# Patient Record
Sex: FEMALE | Race: WHITE | NOT HISPANIC OR LATINO | Employment: FULL TIME | URBAN - METROPOLITAN AREA
[De-identification: names, ages, dates, MRNs, and addresses within clinical notes are randomized per-mention and may not be internally consistent; named-entity substitution may affect disease eponyms.]

---

## 2017-08-01 ASSESSMENT — ENCOUNTER SYMPTOMS
PANIC: 0
DECREASED CONCENTRATION: 1
RECTAL PAIN: 0
HEARTBURN: 0
JAUNDICE: 0
INSOMNIA: 1
RECTAL BLEEDING: 0
ABDOMINAL PAIN: 0
CONSTIPATION: 0
DIARRHEA: 1
VOMITING: 0
BLOOD IN STOOL: 0
BLOATING: 0
BOWEL INCONTINENCE: 0
DEPRESSION: 0
NAUSEA: 1
NERVOUS/ANXIOUS: 1

## 2017-08-01 ASSESSMENT — ANXIETY QUESTIONNAIRES
4. TROUBLE RELAXING: MORE THAN HALF THE DAYS
GAD7 TOTAL SCORE: 7
5. BEING SO RESTLESS THAT IT IS HARD TO SIT STILL: SEVERAL DAYS
7. FEELING AFRAID AS IF SOMETHING AWFUL MIGHT HAPPEN: MORE THAN HALF THE DAYS
GAD7 TOTAL SCORE: 7
7. FEELING AFRAID AS IF SOMETHING AWFUL MIGHT HAPPEN: MORE THAN HALF THE DAYS
6. BECOMING EASILY ANNOYED OR IRRITABLE: NOT AT ALL
2. NOT BEING ABLE TO STOP OR CONTROL WORRYING: NOT AT ALL
3. WORRYING TOO MUCH ABOUT DIFFERENT THINGS: NOT AT ALL
GAD7 TOTAL SCORE: 7
1. FEELING NERVOUS, ANXIOUS, OR ON EDGE: MORE THAN HALF THE DAYS

## 2017-08-02 ASSESSMENT — ANXIETY QUESTIONNAIRES: GAD7 TOTAL SCORE: 7

## 2017-08-08 ENCOUNTER — OFFICE VISIT (OUTPATIENT)
Dept: OBGYN | Facility: CLINIC | Age: 42
End: 2017-08-08
Attending: NURSE PRACTITIONER
Payer: COMMERCIAL

## 2017-08-08 VITALS
DIASTOLIC BLOOD PRESSURE: 76 MMHG | WEIGHT: 115 LBS | HEART RATE: 93 BPM | SYSTOLIC BLOOD PRESSURE: 115 MMHG | HEIGHT: 63 IN | BODY MASS INDEX: 20.38 KG/M2

## 2017-08-08 DIAGNOSIS — Z30.432 ENCOUNTER FOR IUD REMOVAL: Primary | ICD-10-CM

## 2017-08-08 DIAGNOSIS — Z11.3 SCREEN FOR STD (SEXUALLY TRANSMITTED DISEASE): ICD-10-CM

## 2017-08-08 PROCEDURE — 99212 OFFICE O/P EST SF 10 MIN: CPT | Mod: ZF

## 2017-08-08 PROCEDURE — 58301 REMOVE INTRAUTERINE DEVICE: CPT | Mod: ZF | Performed by: NURSE PRACTITIONER

## 2017-08-08 PROCEDURE — 86803 HEPATITIS C AB TEST: CPT | Performed by: NURSE PRACTITIONER

## 2017-08-08 PROCEDURE — 87491 CHLMYD TRACH DNA AMP PROBE: CPT | Performed by: NURSE PRACTITIONER

## 2017-08-08 PROCEDURE — 86780 TREPONEMA PALLIDUM: CPT | Performed by: NURSE PRACTITIONER

## 2017-08-08 PROCEDURE — 36415 COLL VENOUS BLD VENIPUNCTURE: CPT | Performed by: NURSE PRACTITIONER

## 2017-08-08 PROCEDURE — 87591 N.GONORRHOEAE DNA AMP PROB: CPT | Performed by: NURSE PRACTITIONER

## 2017-08-08 PROCEDURE — 87389 HIV-1 AG W/HIV-1&-2 AB AG IA: CPT | Performed by: NURSE PRACTITIONER

## 2017-08-08 ASSESSMENT — PAIN SCALES - GENERAL: PAINLEVEL: NO PAIN (0)

## 2017-08-08 NOTE — LETTER
"2017       RE: Anastasia Colbert  [address]  [place] [address]     Dear Colleague,    Thank you for referring your patient, Anastasia Colbert, to the [facility] at [facility]. Please see a copy of my visit note below.    SUBJECTIVE: Anastasia Colbert is a 42 year old  female, requests removal of IUD and consult re: STD testing and Pap smear frequency.    1)  Consult for frequency of Pap smears: Anastasia had a LEEP in .  Has not had any abnormal pap smears since then.  Desiring to know how often she should have cervical cancer screening.  Last pap two pap smears:  NIL and 10/2016 NIL, HPV negative     2) Desire for Paragard IUD removal as it expires 2017 and discussion of birth control methods.  Recently went through a divorce and has a new male partner who has had a vasectomy and completed the recommended follow-up.  The Paragard IUD has made her have heavier menstrual bleeding.  Anastasia has not liked the way that hormonal birth control has made her feel in the past    3) Desire for STD testing: New sexual partner x 1 month; he has completed STD testing which was negative.  He does have other sexual partners.  No known exposures to STDs; denies any vaginal or pelvic symptoms today.     Verification of Procedure:  Just before the procedure began through verbal and active participation of team members, I verified:     Initials   Patient Name SLP   Patient  SLP   Procedure to be performed SLP     Consent:  Risks, benefits of treatment, and alternative options for contraception were discussed.  Patient's questions were elicited and answered.  Written consent was obtained and scanned into medical record.     OBJECTIVE: /76 (BP Location: Left arm, Patient Position: Chair, Cuff Size: Adult Regular)  Pulse 93  Ht 1.6 m (5' 2.99\")  Wt 52.2 kg (115 lb)  LMP 2017  BMI 20.38 kg/m2    Pelvic " Exam:  EG/BUS: Normal genital architecture without lesions, erythema or abnormal secretions. Bartholin's, Urethra, [place]'s glands are normal.   Vagina: moist, pink, rugae with creamy, white and odorless secretions  Cervix: pink, moist, closed, without lesion or CMT and IUD strings extend 1 cm from external os.  Uterus: small, smooth, firm, mobile w/o pain     PROCEDURE NOTE - ParaGard IUD removal    Reason for removal: time has  on previous IUD    Cervix was visualized with a medium Jimy speculum. The strings were grasped with a uterine packing forceps and the IUD removed with gentle traction.  No resistance was encountered.  The ParaGard IUD immediately regained resting shape and was without odor or discoloration.  There were no complications.  Anastasia Colbert reported no pain.  There was minimal bleeding.     EBL:  Minimal     Complications: None      Anastasia Colbert tolerated procedure well.    PLAN:    1. Paragard IUD was removed today.  Patient was instructed to report heavy bleeding, severe cramping, or abnormal vaginal discharge.  May take Ibuprofen 400-800 mg PO TID PRN or Naproxen 500 mg PO BID for cramping.  Anastasia was instructed that her fertility is immediately reversed now that the IUD has been removed. Discussed contraceptive options with Anastasia. She only has 1 sexual partner now, who has had a vasectomy.  No plans for new partners at this time.  She does not desire any hormonal birth control; would like to avoid another Paragard if possible due to her heavier bleeding.  After discussion, Anastasia desires to not have another Paragard IUD placed at this time, and expressed understanding that if she were to change partners, that she would be at risk of pregnancy if not using birth control.  Anastasia will return to clinic for placement of a Paragard IUD if she changes partners and needs birth control.      2) Discussed recommendations for pap smear testing.  Given that her last abnormal pap  smear was 16 years ago and all screening since then has been normal, Anastasia may resume regular, routine screening intervals.  Last pap smear was 10/2016 and cytology was NIL and HPV test was negative; next pap smear is due 10/2021.    3) Discussed length of time from exposure until each STD test would result as positive.  Anastasia desires STD testing today.  Gonorrhea & chlamydia testing completed and HIV, Hep C, and Anti-treponema to be completed at the lab following this visit.  Anastasia may check with her insurance how often STD screening would be covered; encouraged completing STD testing as part of her annual exams if her and her partners are not in a monogamous relationship.     Anastasia Amy Clobert  verbalized understanding of instructions.    Isi Thakkar, DNP, APRN, WHNP

## 2017-08-08 NOTE — PROGRESS NOTES
"  SUBJECTIVE: Anastasia Colbert is a 42 year old  female, requests removal of IUD and consult re: STD testing and Pap smear frequency.    1)  Consult for frequency of Pap smears: Anastasia had a LEEP in .  Has not had any abnormal pap smears since then.  Desiring to know how often she should have cervical cancer screening.  Last pap two pap smears:  NIL and 10/2016 NIL, HPV negative     2) Desire for Paragard IUD removal as it expires 2017 and discussion of birth control methods.  Recently went through a divorce and has a new male partner who has had a vasectomy and completed the recommended follow-up.  The Paragard IUD has made her have heavier menstrual bleeding.  Anastasia has not liked the way that hormonal birth control has made her feel in the past    3) Desire for STD testing: New sexual partner x 1 month; he has completed STD testing which was negative.  He does have other sexual partners.  No known exposures to STDs; denies any vaginal or pelvic symptoms today.     Verification of Procedure:  Just before the procedure began through verbal and active participation of team members, I verified:     Initials   Patient Name SLP   Patient  SLP   Procedure to be performed SLP     Consent:  Risks, benefits of treatment, and alternative options for contraception were discussed.  Patient's questions were elicited and answered.  Written consent was obtained and scanned into medical record.     OBJECTIVE: /76 (BP Location: Left arm, Patient Position: Chair, Cuff Size: Adult Regular)  Pulse 93  Ht 1.6 m (5' 2.99\")  Wt 52.2 kg (115 lb)  LMP 2017  BMI 20.38 kg/m2    Pelvic Exam:  EG/BUS: Normal genital architecture without lesions, erythema or abnormal secretions. Bartholin's, Urethra, [place]'s glands are normal.   Vagina: moist, pink, rugae with creamy, white and odorless secretions  Cervix: pink, moist, closed, without lesion or CMT and IUD strings extend 1 cm from external " os.  Uterus: small, smooth, firm, mobile w/o pain     PROCEDURE NOTE - ParaGard IUD removal    Reason for removal: time has  on previous IUD    Cervix was visualized with a medium Jimy speculum. The strings were grasped with a uterine packing forceps and the IUD removed with gentle traction.  No resistance was encountered.  The ParaGard IUD immediately regained resting shape and was without odor or discoloration.  There were no complications.  Anastasia Colbert reported no pain.  There was minimal bleeding.     EBL:  Minimal     Complications: None      Anastasia Colbert tolerated procedure well.    PLAN:    1. Paragard IUD was removed today.  Patient was instructed to report heavy bleeding, severe cramping, or abnormal vaginal discharge.  May take Ibuprofen 400-800 mg PO TID PRN or Naproxen 500 mg PO BID for cramping.  Anastasia was instructed that her fertility is immediately reversed now that the IUD has been removed. Discussed contraceptive options with Anastasia. She only has 1 sexual partner now, who has had a vasectomy.  No plans for new partners at this time.  She does not desire any hormonal birth control; would like to avoid another Paragard if possible due to her heavier bleeding.  After discussion, Anastasia desires to not have another Paragard IUD placed at this time, and expressed understanding that if she were to change partners, that she would be at risk of pregnancy if not using birth control.  Anastasia will return to clinic for placement of a Paragard IUD if she changes partners and needs birth control.      2) Discussed recommendations for pap smear testing.  Given that her last abnormal pap smear was 16 years ago and all screening since then has been normal, Anastasia may resume regular, routine screening intervals.  Last pap smear was 10/2016 and cytology was NIL and HPV test was negative; next pap smear is due 10/2021.    3) Discussed length of time from exposure until each STD test would  result as positive.  Anastasia desires STD testing today.  Gonorrhea & chlamydia testing completed and HIV, Hep C, and Anti-treponema to be completed at the lab following this visit.  Anastasia may check with her insurance how often STD screening would be covered; encouraged completing STD testing as part of her annual exams if her and her partners are not in a monogamous relationship.     Anastasia Amy Colbert  verbalized understanding of instructions.    Isi Thakkar, DNP, APRN, WHNP

## 2017-08-09 LAB
C TRACH DNA SPEC QL NAA+PROBE: NORMAL
HCV AB SERPL QL IA: NORMAL
HIV 1+2 AB+HIV1 P24 AG SERPL QL IA: NORMAL
N GONORRHOEA DNA SPEC QL NAA+PROBE: NORMAL
SPECIMEN SOURCE: NORMAL
SPECIMEN SOURCE: NORMAL
T PALLIDUM IGG+IGM SER QL: NEGATIVE

## 2017-10-11 ENCOUNTER — OFFICE VISIT (OUTPATIENT)
Dept: OBGYN | Facility: CLINIC | Age: 42
End: 2017-10-11
Attending: OBSTETRICS & GYNECOLOGY
Payer: COMMERCIAL

## 2017-10-11 VITALS
SYSTOLIC BLOOD PRESSURE: 109 MMHG | HEIGHT: 63 IN | BODY MASS INDEX: 20.25 KG/M2 | WEIGHT: 114.3 LBS | HEART RATE: 68 BPM | DIASTOLIC BLOOD PRESSURE: 63 MMHG

## 2017-10-11 DIAGNOSIS — N89.8 VAGINAL DISCHARGE: Primary | ICD-10-CM

## 2017-10-11 DIAGNOSIS — N76.0 BV (BACTERIAL VAGINOSIS): ICD-10-CM

## 2017-10-11 DIAGNOSIS — B96.89 BV (BACTERIAL VAGINOSIS): ICD-10-CM

## 2017-10-11 PROCEDURE — 87591 N.GONORRHOEAE DNA AMP PROB: CPT | Performed by: OBSTETRICS & GYNECOLOGY

## 2017-10-11 PROCEDURE — 87491 CHLMYD TRACH DNA AMP PROBE: CPT | Performed by: OBSTETRICS & GYNECOLOGY

## 2017-10-11 RX ORDER — METRONIDAZOLE 500 MG/1
500 TABLET ORAL 2 TIMES DAILY
Qty: 14 TABLET | Refills: 1 | Status: SHIPPED | OUTPATIENT
Start: 2017-10-11 | End: 2018-03-16

## 2017-10-11 NOTE — LETTER
"10/11/2017       RE: Anastasia Colbert  [address]  [place] [address]     Dear Colleague,    Thank you for referring your patient, Anastasia Colbert, to the [facility] at [facility]. Please see a copy of my visit note below.    S: 41 yo P2 with new onset 3 days irritating watery foul discharge with pelvic pressure and dysuria.   No fevers, chills, nausea, emesis, diarrhea.     Patient Active Problem List   Diagnosis     Hypothyroidism     Abnormal glandular Papanicolaou smear of cervix     Past Medical History:   Diagnosis Date     Abnormal glandular Papanicolaou smear of cervix 2001    LEEP     Allergic rhinitis      Depressive disorder, not elsewhere classified     hx of as teen (hosp 1993)     Eating disorder, unspecified     Hx of      Intestinal bacterial overgrowth      Unspecified hypothyroidism     Borderline- TSH 5.20 6/19/06. On levoxyl 50 mcg 44/05     /63  Pulse 68  Ht 1.6 m (5' 2.99\")  Wt 51.8 kg (114 lb 4.8 oz)  LMP 10/01/2017 (Exact Date)  BMI 20.25 kg/m2  Appears well. Abd: soft NT, ND  EG wnl  Vagina: thin grey discharge  Wet prep clue cells    A: BV  P: GC/CH sent  Rx sent.       Again, thank you for allowing me to participate in the care of your patient.      Sincerely,    Elsa Dupont MD      "

## 2017-10-11 NOTE — MR AVS SNAPSHOT
"              After Visit Summary   10/11/2017    Anastasia Colbert    MRN: [id number]           Patient Information     Date Of Birth          1975        Visit Information        Provider Department      10/11/2017 7:45 AM Elsa Dupont MD [facility]        Today's Diagnoses     Vaginal discharge    -  1    BV (bacterial vaginosis)           Follow-ups after your visit        Who to contact     Please call your clinic at [phone] to:    Ask questions about your health    Make or cancel appointments    Discuss your medicines    Learn about your test results    Speak to your doctor   If you have compliments or concerns about an experience at your clinic, or if you wish to file a complaint, please contact [facility] Patient Relations at [phone] or email us at [email or web address]         Additional Information About Your Visit        MyChart Information     [employer] gives you secure access to your electronic health record. If you see a primary care provider, you can also send messages to your care team and make appointments. If you have questions, please call your primary care clinic.  If you do not have a primary care provider, please call [phone] and they will assist you.      [employer] is an electronic gateway that provides easy, online access to your medical records. With [employer], you can request a clinic appointment, read your test results, renew a prescription or communicate with your care team.     To access your existing account, please contact your [facility] or call [phone] for assistance.        Care EveryWhere ID     This is your Care EveryWhere ID. This could be used by other organizations to access your [place] medical records  [id number]        Your Vitals Were     Pulse Height Last Period BMI (Body Mass Index)          68 1.6 m (5' 2.99\") 10/01/2017 (Exact Date) 20.25 kg/m2   "       Blood Pressure from Last 3 Encounters:   10/11/17 109/63   08/08/17 115/76   12/19/16 113/71    Weight from Last 3 Encounters:   10/11/17 51.8 kg (114 lb 4.8 oz)   08/08/17 52.2 kg (115 lb)   12/19/16 52.8 kg (116 lb 8 oz)              We Performed the Following     Chlamydia PCR (Clinic Collect)     Gonorrhea PCR     Routine UA with Micro Reflex to Culture     Wet Prep (Collected in Clinic)          Today's Medication Changes          These changes are accurate as of: 10/11/17  9:45 AM.  If you have any questions, ask your nurse or doctor.               Start taking these medicines.        Dose/Directions    metroNIDAZOLE 500 MG tablet   Commonly known as:  FLAGYL   Used for:  Vaginal discharge, BV (bacterial vaginosis)   Started by:  Elsa Dupont MD        Dose:  500 mg   Take 1 tablet (500 mg) by mouth 2 times daily   Quantity:  14 tablet   Refills:  1            Where to get your medicines      These medications were sent to [facility] [address]  [address] [facility] [address] [address]     Phone:  [phone]     metroNIDAZOLE 500 MG tablet                Primary Care Provider Office Phone # Fax #    Alfa Lundberg [phone]       [address] [facility] [address]  [facility] [address]        Equal Access to Services     ROBERTA RODGERS AH: Hadii tk ku hadasho Soomaali, waaxda luqadaha, qaybta kaalmada adeegyada, manny youssef. So [facility] [phone].    ATENCIÓN: Si habla español, tiene a tamayo disposición servicios gratuitos de asistencia lingüística. Llame al [phone].    We comply with applicable federal civil rights laws and [place] laws. We do not discriminate on the basis of race, color, national origin, age, disability, sex, sexual orientation, or gender identity.            Thank you!     Thank you for choosing [facility]  for your care. Our goal is always to provide you with  excellent care. Hearing back from our patients is one way we can continue to improve our services. Please take a few minutes to complete the written survey that you may receive in the mail after your visit with us. Thank you!             Your Updated Medication List - Protect others around you: Learn how to safely use, store and throw away your medicines at www.disposemymeds.org.          This list is accurate as of: 10/11/17  9:45 AM.  Always use your most recent med list.                   Brand Name Dispense Instructions for use Diagnosis    CLARITIN PO      Take 1 tablet by mouth.    Shortness of breath, Diarrhea, Nausea with vomiting, Chest pain, unspecified, Abdominal pain, epigastric, Palpitations, Hypotension, Sweaty palms, Tachycardia, Sweats, sweating, excessive       levothyroxine 75 MCG tablet    SYNTHROID/LEVOTHROID    90 tablet    Take 1 tablet (75 mcg) by mouth daily    Hypothyroidism       metroNIDAZOLE 500 MG tablet    FLAGYL    14 tablet    Take 1 tablet (500 mg) by mouth 2 times daily    Vaginal discharge, BV (bacterial vaginosis)       paragard intrauterine copper     one    insert one in the clinic today    Other general counseling and advice for contraceptive management, Encounter for insertion or removal of intrauterine contraceptive device       rifaximin 550 MG Tabs tablet    XIFAXAN    42 tablet    Take 1 tablet (550 mg) by mouth 3 times daily    Bacterial overgrowth syndrome       sertraline 25 MG tablet    ZOLOFT    30 tablet    Take 1 tablet (25 mg) by mouth daily    Anxiety

## 2017-10-11 NOTE — PROGRESS NOTES
"S: 43 yo P2 with new onset 3 days irritating watery foul discharge with pelvic pressure and dysuria.   No fevers, chills, nausea, emesis, diarrhea.     Patient Active Problem List   Diagnosis     Hypothyroidism     Abnormal glandular Papanicolaou smear of cervix     Past Medical History:   Diagnosis Date     Abnormal glandular Papanicolaou smear of cervix 2001    LEEP     Allergic rhinitis      Depressive disorder, not elsewhere classified     hx of as teen (hosp 1993)     Eating disorder, unspecified     Hx of      Intestinal bacterial overgrowth      Unspecified hypothyroidism     Borderline- TSH 5.20 6/19/06. On levoxyl 50 mcg 44/05     /63  Pulse 68  Ht 1.6 m (5' 2.99\")  Wt 51.8 kg (114 lb 4.8 oz)  LMP 10/01/2017 (Exact Date)  BMI 20.25 kg/m2  Appears well. Abd: soft NT, ND  EG wnl  Vagina: thin grey discharge  Wet prep clue cells    A: BV  P: GC/CH sent  Rx sent.   Elsa Dupont    "

## 2017-10-12 LAB
C TRACH DNA SPEC QL NAA+PROBE: NEGATIVE
N GONORRHOEA DNA SPEC QL NAA+PROBE: NEGATIVE
SPECIMEN SOURCE: NORMAL
SPECIMEN SOURCE: NORMAL

## 2017-11-06 ENCOUNTER — TELEPHONE (OUTPATIENT)
Dept: OBGYN | Facility: CLINIC | Age: 42
End: 2017-11-06

## 2017-11-06 DIAGNOSIS — N76.0 BACTERIAL VAGINOSIS: Primary | ICD-10-CM

## 2017-11-06 DIAGNOSIS — B96.89 BACTERIAL VAGINOSIS: Primary | ICD-10-CM

## 2017-11-06 RX ORDER — METRONIDAZOLE 7.5 MG/G
1 GEL VAGINAL AT BEDTIME
Qty: 70 G | Refills: 0 | Status: SHIPPED | OUTPATIENT
Start: 2017-11-06 | End: 2017-12-14

## 2017-11-06 NOTE — TELEPHONE ENCOUNTER
Anastasia saw Dr Dupont 10/11/17 and was prescribed flagyl for BV. She reports it helped but has same s/s once again. This time requesting metrogel because flagyl was too hard on her stomach.  I will forward to on call provider, Dr Zamorano .    ,

## 2017-12-14 ENCOUNTER — TELEPHONE (OUTPATIENT)
Dept: OBGYN | Facility: CLINIC | Age: 42
End: 2017-12-14

## 2017-12-14 DIAGNOSIS — B96.89 BACTERIAL VAGINOSIS: ICD-10-CM

## 2017-12-14 DIAGNOSIS — N76.0 BACTERIAL VAGINOSIS: ICD-10-CM

## 2017-12-14 RX ORDER — METRONIDAZOLE 7.5 MG/G
1 GEL VAGINAL AT BEDTIME
Qty: 70 G | Refills: 0 | Status: SHIPPED | OUTPATIENT
Start: 2017-12-14 | End: 2018-03-16

## 2017-12-14 NOTE — TELEPHONE ENCOUNTER
----- Message from Chance Culp sent at 12/14/2017  3:00 PM CST -----  Regarding: Call pt- discuss reoccuring BV  Contact: [phone]  Pt would like a call back today if possible to discuss her reoccurring BV. She has an appt scheduled for 12/20/17 but wants to know if she should be taking a med or not. Please f/u with her at [phone]. Thank you.    GY    Please DO NOT send this message and/or reply back to sender.  Call Center Representatives DO NOT respond to messages.

## 2017-12-14 NOTE — TELEPHONE ENCOUNTER
Spoke with Dr. Dupont in clinic who agreed to refill Metrogel before her upcoming appointment. Spoke with patient and she is wondering what might be done at her upcoming appointment, nurse discussed possibility of trying boric acid. Patient agreeable to plan. Will start metrogel and be seen next week.

## 2017-12-17 ENCOUNTER — HEALTH MAINTENANCE LETTER (OUTPATIENT)
Age: 42
End: 2017-12-17

## 2018-02-06 DIAGNOSIS — E03.9 HYPOTHYROIDISM: ICD-10-CM

## 2018-02-06 NOTE — TELEPHONE ENCOUNTER
levothyroxine (SYNTHROID/LEVOTHROID) 75 MCG tablet      Last Written Prescription Date:  12/22/16  Last Fill Quantity: 90,   # refills: 3  Last Office Visit : 11/15/16  Future Office visit:  2/16/18    Routing refill request to provider for review/approval because:  Med failed protocol-lab and appt overdue.  Last TSH 12/19/16- No future order in chart.  Pended Rx for 30 days-no added refills until seen.

## 2018-02-08 RX ORDER — LEVOTHYROXINE SODIUM 75 UG/1
75 TABLET ORAL DAILY
Qty: 90 TABLET | Refills: 0 | Status: SHIPPED | OUTPATIENT
Start: 2018-02-08 | End: 2018-02-16

## 2018-02-13 NOTE — TELEPHONE ENCOUNTER
Patient called asking about status of Levothyroxine refill. Order was signed on 2-8-18. Called [facility], They did receive the order and the medication was shipped on 2-9-18. If patient does not receive it today it will be in the next couple of days. The patients was called and this information was left for her with phone number to call back with any questions.    Kathleen M Doege RN

## 2018-02-14 ASSESSMENT — ENCOUNTER SYMPTOMS
CONSTIPATION: 0
DIARRHEA: 1
FATIGUE: 0
HOARSE VOICE: 1
JAUNDICE: 0
NERVOUS/ANXIOUS: 1
SORE THROAT: 0
TROUBLE SWALLOWING: 0
DECREASED APPETITE: 1
INSOMNIA: 1
TASTE DISTURBANCE: 0
BLOOD IN STOOL: 0
WEIGHT LOSS: 0
HALLUCINATIONS: 0
FEVER: 0
DEPRESSION: 1
WEIGHT GAIN: 0
HEARTBURN: 0
ABDOMINAL PAIN: 0
NIGHT SWEATS: 0
RECTAL PAIN: 0
NECK MASS: 1
DECREASED CONCENTRATION: 1
SINUS CONGESTION: 1
INCREASED ENERGY: 0
SMELL DISTURBANCE: 0
NAUSEA: 1
PANIC: 0
POLYPHAGIA: 0
BOWEL INCONTINENCE: 0
SINUS PAIN: 0
VOMITING: 0
ALTERED TEMPERATURE REGULATION: 1
BLOATING: 0
POLYDIPSIA: 0
CHILLS: 0

## 2018-02-16 ENCOUNTER — OFFICE VISIT (OUTPATIENT)
Dept: FAMILY MEDICINE | Facility: CLINIC | Age: 43
End: 2018-02-16
Payer: COMMERCIAL

## 2018-02-16 VITALS
DIASTOLIC BLOOD PRESSURE: 64 MMHG | RESPIRATION RATE: 20 BRPM | SYSTOLIC BLOOD PRESSURE: 96 MMHG | OXYGEN SATURATION: 98 % | WEIGHT: 109.2 LBS | BODY MASS INDEX: 19.35 KG/M2 | HEART RATE: 76 BPM

## 2018-02-16 DIAGNOSIS — E55.9 VITAMIN D DEFICIENCY: ICD-10-CM

## 2018-02-16 DIAGNOSIS — F41.9 ANXIETY: ICD-10-CM

## 2018-02-16 DIAGNOSIS — Z11.3 SCREEN FOR STD (SEXUALLY TRANSMITTED DISEASE): ICD-10-CM

## 2018-02-16 DIAGNOSIS — R49.9 VOICE COMPLAINT: Primary | ICD-10-CM

## 2018-02-16 DIAGNOSIS — Z00.00 ROUTINE GENERAL MEDICAL EXAMINATION AT A HEALTH CARE FACILITY: ICD-10-CM

## 2018-02-16 DIAGNOSIS — H61.23 BILATERAL IMPACTED CERUMEN: ICD-10-CM

## 2018-02-16 DIAGNOSIS — E03.9 HYPOTHYROIDISM, UNSPECIFIED TYPE: ICD-10-CM

## 2018-02-16 LAB
DEPRECATED CALCIDIOL+CALCIFEROL SERPL-MC: 16 UG/L (ref 20–75)
ERYTHROCYTE [DISTWIDTH] IN BLOOD BY AUTOMATED COUNT: 11.8 % (ref 10–15)
HCT VFR BLD AUTO: 42.9 % (ref 35–47)
HGB BLD-MCNC: 14.1 G/DL (ref 11.7–15.7)
HIV 1+2 AB+HIV1 P24 AG SERPL QL IA: NONREACTIVE
MCH RBC QN AUTO: 32.2 PG (ref 26.5–33)
MCHC RBC AUTO-ENTMCNC: 32.9 G/DL (ref 31.5–36.5)
MCV RBC AUTO: 98 FL (ref 78–100)
PLATELET # BLD AUTO: 182 10E9/L (ref 150–450)
RBC # BLD AUTO: 4.38 10E12/L (ref 3.8–5.2)
TSH SERPL DL<=0.005 MIU/L-ACNC: 2.6 MU/L (ref 0.4–4)
WBC # BLD AUTO: 4.9 10E9/L (ref 4–11)

## 2018-02-16 RX ORDER — LEVOTHYROXINE SODIUM 75 UG/1
75 TABLET ORAL DAILY
Qty: 90 TABLET | Refills: 3 | Status: SHIPPED | OUTPATIENT
Start: 2018-02-16 | End: 2019-07-07

## 2018-02-16 RX ORDER — SERTRALINE HYDROCHLORIDE 25 MG/1
25 TABLET, FILM COATED ORAL DAILY
Qty: 90 TABLET | Refills: 3 | Status: SHIPPED | OUTPATIENT
Start: 2018-02-16 | End: 2018-08-07

## 2018-02-16 ASSESSMENT — PAIN SCALES - GENERAL: PAINLEVEL: NO PAIN (0)

## 2018-02-16 NOTE — MR AVS SNAPSHOT
After Visit Summary   2/16/2018    Anastasia Colbert    MRN: [id number]           Patient Information     Date Of Birth          1975        Visit Information        Provider Department      2/16/2018 8:05 AM Alfa Lundberg MD [facility]        Today's Diagnoses     Voice complaint    -  1    Routine general medical examination at a health care facility        Vitamin D deficiency        Hypothyroidism, unspecified type        Screen for STD (sexually transmitted disease)        Anxiety        Bilateral impacted cerumen          Care Instructions    Primary Care Center: [phone]     Primary Care Center Medication Refill Request Information:  * Please contact your pharmacy regarding ANY request for medication refills.  ** [facility] Prescription Fax = [phone]  * Please allow 3 business days for routine medication refills.  * Please allow 5 business days for controlled substance medication refills.     Primary Care Center Test Result notification information:  *You will be notified with in 7-10 days of your appointment day regarding the results of your test.  If you are on MyChart you will be notified as soon as the provider has reviewed the results and signed off on them.              Follow-ups after your visit        Additional Services     OTOLARYNGOLOGY REFERRAL       Your provider has referred you to: [facility]: Adult Ear, Nose and Throat Clinic (Otolaryngology) - [place] [phone]  [email or web address]    Dr Aguiar    Please be aware that coverage of these services is subject to the terms and limitations of your health insurance plan.  Call member services at your health plan with any benefit or coverage questions.      Please bring the following with you to your appointment:    (1) Any X-Rays, CTs or MRIs which have been performed.  Contact the facility where they were done to arrange for  prior to your  "scheduled appointment.   (2) List of current medications  (3) This referral request   (4) Any documents/labs given to you for this referral                  Follow-up notes from your care team     Return in about 1 year (around 2/16/2019).      Your next 10 appointments already scheduled     Feb 16, 2018  9:15 AM CST   LAB with UC LAB   [facility] ([facility])    [address] [place] [id number] [phone]           Please do not eat 10-12 hours before your appointment if you are coming in fasting for labs on lipids, cholesterol, or glucose (sugar). This does not apply to pregnant women. Water, hot tea and black coffee (with nothing added) are okay. Do not drink other fluids, diet soda or chew gum.            Mar 26, 2018  9:15 AM CDT   (Arrive by 9:00 AM)   MA SCREENING DIGITAL BILATERAL with [id number]   [facility] ([facility])    [address] [place] [id number] [phone]           Do not use any powder, lotion or deodorant under your arms or on your breast. If you do, we will ask you to remove it before your exam.  Wear comfortable, two-piece clothing.  If you have any allergies, tell your care team.  Bring any previous mammograms from other facilities or have them mailed to the breast center. Three-dimensional (3D) mammograms are available at [facility] locations in [facility], [facility], [place], [place], and [place]. [facility] locations include [place] [facility] in [place]. Benefits of 3D mammograms include: - Improved rate of cancer detection - Decreases your chance of having to go back for more tests, which means fewer: - \"False-positive\" results (This means that there is an abnormal area but it isn't cancer.) - Invasive testing procedures, such as a biopsy or surgery - Can provide clearer images of the breast if you have " dense breast tissue. 3D mammography is an optional exam that anyone can have with a 2D mammogram. It doesn't replace or take the place of a 2D mammogram. 2D mammograms remain an effective screening test for all women.  Not all insurance companies cover the cost of a 3D mammogram. Check with your insurance.            Mar 26, 2018 10:10 AM CDT   (Arrive by 9:55 AM)   NEW VOICE with Isi Aguiar MD   [facility] [place])    [address] [place] [address]  4th [facility] [address] [phone]           This is a multi-disciplinary care team visit as patients with your type of problem are usually seen by a team of an MD and a Speech-Language Pathologist (who is a specialist in disorders of the voice, throat, and breathing).  Please plan about 2 hours for your visit, which will likely include Laryngeal Function Studies, a Voice/Swallow/Breathing Evaluation, and an Endoscopic Laryngeal Examination to provide a comprehensive evaluation.  Please check with your insurance company to ensure you are covered for these services. - It is important to know that if you are evaluated and/or treated by both a physician and a speech pathologist during your visit, your billing will reflect the input that you receive from both providers as separate professionals. Although most insurance plans do cover these services, we encourage you to contact your insurance company prior to your visit to determine whether there are any coverage limitations that might affect you financially. - Billing/procedure codes that are frequently associated with visits to our clinic include (but are not limited to) the ones listed below. Most patients will not need all of these items, but it may be useful to ask your insurance company's patient . 09166: Flexible fiberoptic laryngoscopy, 82632: Laryngoscopy; flexible or rigid fiberoptic, with stroboscopy, 71554: Flexible  endoscopic evaluation of swallowing, 18577: Laryngeal function aerodynamic evaluation, 40395: Evaluation of Voice and Resonance, 78625: Speech pathology treatment for voice, speech, communication, 24187: Speech pathology treatment for swallowing/oral function for feeding - If you have any concerns or questions, or if you would prefer not to have a speech pathologist involved in your visit, please contact our Clinic Coordinator at [phone], at least 24 hours prior to your appointment.            Mar 26, 2018 10:10 AM CDT   (Arrive by 9:55 AM)   [facility] Speech Pathology and ENT Evaluation with  Ent Dysphonia Slp Provider   [facility] ([facility] [place])    [address]  4th Floor  [facility] [address] [phone]              Future tests that were ordered for you today     Open Future Orders        Priority Expected Expires Ordered    Mammogram, routine screening Routine  2/16/2019 2/16/2018    TSH with free T4 reflex Routine 2/16/2018 3/2/2018 2/16/2018    Vitamin D Deficiency Routine 2/16/2018 2/16/2019 2/16/2018    CBC with platelets Routine 2/16/2018 3/2/2018 2/16/2018    HIV Antigen Antibody Combo Routine 2/16/2018 2/16/2019 2/16/2018            Who to contact     Please call your clinic at [phone] to:    Ask questions about your health    Make or cancel appointments    Discuss your medicines    Learn about your test results    Speak to your doctor            Additional Information About Your Visit        [employer] Information     [employer] gives you secure access to your electronic health record. If you see a primary care provider, you can also send messages to your care team and make appointments. If you have questions, please call your primary care clinic.  If you do not have a primary care provider, please call [phone] and they will assist you.      [employer] is an electronic gateway that provides easy, online access to your medical records. With [employer], you can  request a clinic appointment, read your test results, renew a prescription or communicate with your care team.     To access your existing account, please contact your [facility] or call [phone] for assistance.        Care EveryWhere ID     This is your Care EveryWhere ID. This could be used by other organizations to access your [place] medical records  [id number]        Your Vitals Were     Pulse Respirations Pulse Oximetry BMI (Body Mass Index)          76 20 98% 19.35 kg/m2         Blood Pressure from Last 3 Encounters:   02/16/18 96/64   10/11/17 109/63   08/08/17 115/76    Weight from Last 3 Encounters:   02/16/18 49.5 kg (109 lb 3.2 oz)   10/11/17 51.8 kg (114 lb 4.8 oz)   08/08/17 52.2 kg (115 lb)              We Performed the Following     OTOLARYNGOLOGY REFERRAL     REMOVE IMPACTED CERUMEN          Today's Medication Changes          These changes are accurate as of 2/16/18  9:05 AM.  If you have any questions, ask your nurse or doctor.               These medicines have changed or have updated prescriptions.        Dose/Directions    levothyroxine 75 MCG tablet   Commonly known as:  SYNTHROID/LEVOTHROID   This may have changed:  additional instructions   Used for:  Hypothyroidism, unspecified type   Changed by:  Alfa Lundberg MD        Dose:  75 mcg   Take 1 tablet (75 mcg) by mouth daily   Quantity:  90 tablet   Refills:  3            Where to get your medicines      These medications were sent to [facility] - [place], [place] - [address] [facility]  [address] [facility] [address]     Phone:  [phone]     levothyroxine 75 MCG tablet         Some of these will need a paper prescription and others can be bought over the counter.  Ask your nurse if you have questions.     Bring a paper prescription for each of these medications     sertraline 25 MG tablet                Primary Care Provider Office Phone # Fax #     Alfa Lundberg [phone]       [address] [facility] [address]  [facility] [address]        Equal Access to Services     ROBERTA RODGERS : Mariel tk aguilera loulou Adams, watirsoda rick, qanelsonta kalalitoda chelsea, manny sheamorales mikael. So [facility] [phone].    ATENCIÓN: Si habla español, tiene a tamayo disposición servicios gratuitos de asistencia lingüística. Llame al [phone].    We comply with applicable federal civil rights laws and [place] laws. We do not discriminate on the basis of race, color, national origin, age, disability, sex, sexual orientation, or gender identity.            Thank you!     Thank you for choosing [facility]  for your care. Our goal is always to provide you with excellent care. Hearing back from our patients is one way we can continue to improve our services. Please take a few minutes to complete the written survey that you may receive in the mail after your visit with us. Thank you!             Your Updated Medication List - Protect others around you: Learn how to safely use, store and throw away your medicines at www.disposemymeds.org.          This list is accurate as of 2/16/18  9:05 AM.  Always use your most recent med list.                   Brand Name Dispense Instructions for use Diagnosis    CLARITIN PO      Take 1 tablet by mouth.    Shortness of breath, Diarrhea, Nausea with vomiting, Chest pain, unspecified, Abdominal pain, epigastric, Palpitations, Hypotension, Sweaty palms, Tachycardia, Sweats, sweating, excessive       levothyroxine 75 MCG tablet    SYNTHROID/LEVOTHROID    90 tablet    Take 1 tablet (75 mcg) by mouth daily    Hypothyroidism, unspecified type       metroNIDAZOLE 0.75 % vaginal gel    METROGEL    70 g    Place 1 applicator (5 g) vaginally At Bedtime    Bacterial vaginosis       metroNIDAZOLE 500 MG tablet    FLAGYL    14 tablet    Take 1 tablet (500 mg) by mouth 2 times daily    Vaginal discharge, BV  (bacterial vaginosis)       paragard intrauterine copper     one    insert one in the clinic today    Other general counseling and advice for contraceptive management, Encounter for insertion or removal of intrauterine contraceptive device       rifaximin 550 MG Tabs tablet    XIFAXAN    42 tablet    Take 1 tablet (550 mg) by mouth 3 times daily    Bacterial overgrowth syndrome       sertraline 25 MG tablet    ZOLOFT    90 tablet    Take 1 tablet (25 mg) by mouth daily    Anxiety

## 2018-02-16 NOTE — NURSING NOTE
Bilateral ear wash was done. Had large amounts of impacted cerumen removed out of each ear. Patient was instructed if had any drainage, bleeding or pain in the next 4-5 days to call clinic or go to urgent care.Kaila Oglesby LPN 9:04 AM on 2/16/2018

## 2018-02-16 NOTE — PROGRESS NOTES
[facility]  Primary Care Center   Alfa Lundberg MD  02/16/2018      Chief Complaint:   Physical (Established physical)       History of Present Illness:   Anastasia Colbert is a 42 year old female with a history of depressive disorder, hypothyroidism, and an eating disorder, who presents alone  for her annual physical. The patient reports that she is staying busy between her two jobs. She mentions that she was last seen in clinic last December. The patient has visited GYN a few times since this visit.     She does mention having chronic concerns regarding her voice wearing out and becoming increasingly raspy with extended periods of talking. 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 The patient has seen ENT in the past for a vocal cord check and was found to be unremarkable at that time. The patient mentions taking Loratadine all throughout the year for drainage.     The patient notes that she did switch to a vegetarian diet within the last year, therefore she would like to check her iron levels. She also mentions that she has been consuming dairy, however she expresses interest in checking a vitamin D deficiency. She mentions that winter is a lot harder on her.    In regard to the patient's abdominal concerns, she notes having occasional diarrhea. She notes that she is careful with her diet, usually consuming about the same thing daily. She notes that she does develop an upset stomach occasionally that seems to be stress related. The patient does consume Kefir daily with some relief of her symptoms. She did receive a prescription from an antacid from GI for this concern, however she did not use it as her symptoms have not been that bad.     The patient notes that her anxiety has been slightly worsening recently and she would like to have her Sertraline prescription refilled. She notes that she may not take this medication,  however she appreciates the feeling of knowing that she at least has the medication if she needs it. The patient will be starting therapy next month.     Health Care Maintenance/Other:  1. Thyroid check- per patient request   2.. Recent biometric health screening- unremarkable  3. HIV test- patient has new partner   4. Exercise- more limited in the winter   5. Mammogram- due, no familial breast cancer history  6. Pap smear- follow up every five years, completed through GYN  7. Right-sided enlarged gland   8. Red spot on right cheek- no itching, no bleeding      Review of Systems:   Pertinent items are noted in HPI.  All other systems are negative.    Answers for HPI/ROS submitted by the patient on 2/14/2018   General Symptoms: Yes  Skin Symptoms: No  HENT Symptoms: Yes  EYE SYMPTOMS: No  HEART SYMPTOMS: No  LUNG SYMPTOMS: No  INTESTINAL SYMPTOMS: Yes  URINARY SYMPTOMS: No  GYNECOLOGIC SYMPTOMS: No  BREAST SYMPTOMS: No  SKELETAL SYMPTOMS: No  BLOOD SYMPTOMS: No  NERVOUS SYSTEM SYMPTOMS: No  MENTAL HEALTH SYMPTOMS: Yes  Fever: No  Loss of appetite: Yes  Weight loss: No  Weight gain: No  Fatigue: No  Night sweats: No  Chills: No  Increased stress: Yes  Excessive hunger: No  Excessive thirst: No  Feeling hot or cold when others believe the temperature is normal: Yes  Loss of height: No  Post-operative complications: No  Surgical site pain: No  Hallucinations: No  Change in or Loss of Energy: No  Hyperactivity: No  Confusion: No  Ear pain: No  Ear discharge: No  Hearing loss: No  Tinnitus: No  Nosebleeds: No  Congestion: Yes  Sinus pain: No  Trouble swallowing: No   Voice hoarseness: Yes  Mouth sores: No  Sore throat: No  Tooth pain: No  Bleeding gums: No  Change in taste: No  Change in sense of smell: No  Dry mouth: No  Hearing aid used: No  Neck lump: Yes  Heart burn or indigestion: No  Nausea: Yes  Vomiting: No  Abdominal pain: No  Bloating: No  Constipation: No  Diarrhea: Yes  Blood in stool: No  Black stools:  No  Rectal or Anal pain: No  Fecal incontinence: No  Yellowing of skin or eyes: No  Vomit with blood: No  Change in stools: No  Nervous or Anxious: Yes  Depression: Yes  Trouble sleeping: Yes  Trouble thinking or concentrating: Yes  Mood changes: Yes  Panic attacks: No    Active Medications:      levothyroxine (SYNTHROID/LEVOTHROID) 75 MCG tablet, Take 1 tablet (75 mcg) by mouth daily Patient needs to see primary provider and have labs for further refills., Disp: 90 tablet, Rfl: 0     metroNIDAZOLE (METROGEL) 0.75 % vaginal gel, Place 1 applicator (5 g) vaginally At Bedtime, Disp: 70 g, Rfl: 0     metroNIDAZOLE (FLAGYL) 500 MG tablet, Take 1 tablet (500 mg) by mouth 2 times daily, Disp: 14 tablet, Rfl: 1     rifaximin (XIFAXAN) 550 MG TABS, Take 1 tablet (550 mg) by mouth 3 times daily (Patient not taking: Reported on 8/8/2017), Disp: 42 tablet, Rfl: 11     sertraline (ZOLOFT) 25 MG tablet, Take 1 tablet (25 mg) by mouth daily (Patient not taking: Reported on 8/8/2017), Disp: 30 tablet, Rfl: 11     Loratadine (CLARITIN PO), Take 1 tablet by mouth., Disp: , Rfl:      PARAGARD INTRAUTERINE COPPER IU IUD, insert one in the clinic today, Disp: one, Rfl: 0      Allergies:   Augmentin [penicillins]  Tetracycline  Bactrim [sulfamethoxazole w/trimethoprim]  Zofran [methylparaben-ondansetron-propylpar]      Past Medical History:  Abnormal glandular Papanicolaou smear of cervix, LEEP   Allergic rhinitis   Depressive disorder   Eating disorder unspecified   Intestinal bacterial overgrowth   Unspecified hypothyroidism     Past Surgical History:  Oral surgery procedure   Psychiatric service/therapy for depression  Colonoscopy   Breath hydrogen test     Family History:   Depression   Hypothyroidism   Lipids   Kidney cancer  Bladder cancer  Arthritis   Cerebrovascular disease   Heart disease   Other cancer unspecified       Social History:   The patient is a nonsmoker and does consume alcohol. She does have a son and a daughter.       Physical Exam:   BP 96/64 (BP Location: Right arm, Patient Position: Sitting, Cuff Size: Adult Regular)  Pulse 76  Resp 20  Wt 49.5 kg (109 lb 3.2 oz)  SpO2 98%  BMI 19.35 kg/m2      Constitutional: Alert. In no distress.  Head: Normocephalic. No masses, lesions, tenderness or abnormalities.  ENT: Trace bilateral cerumen impaction. No neck nodes or sinus tenderness.  Cardiovascular: RRR. No murmurs, clicks, gallops, or rub.  Respiratory: Clear to auscultation bilaterally, no wheezes or crackles.  Gastrointestinal: Abdomen soft. Non-tender. BS normal. No masses or organomegaly.  Musculoskeletal: Extremities normal. No gross deformities noted. Normal muscle tone.  Skin: 2 mm red papule on right cheek. No rashes.  Neurologic: Gait normal. Reflexes normal and symmetric. Sensation grossly WNL.  Psychiatric: Mentation appears normal. Normal affect.   Hematologic/Lymphatic/Immunologic: Normal cervical lymph nodes.     Laboratory orders:   Component      Latest Ref Rng & Units 2/16/2018   WBC      4.0 - 11.0 10e9/L 4.9   RBC Count      3.8 - 5.2 10e12/L 4.38   Hemoglobin      11.7 - 15.7 g/dL 14.1   Hematocrit      35.0 - 47.0 % 42.9   MCV      78 - 100 fl 98   MCH      26.5 - 33.0 pg 32.2   MCHC      31.5 - 36.5 g/dL 32.9   RDW      10.0 - 15.0 % 11.8   Platelet Count      150 - 450 10e9/L 182   TSH      0.40 - 4.00 mU/L 2.60     Vitamin D deficiency screening: pending   HIV Antigen antibody combo: pending               Assessment and Plan:  1. Voice complaint  Patient visited otolaryngology a few years ago regarding this concern. 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 Flonase was recommended for rule out of allergy related concerns which should be taken prior to visiting otolaryngology for repeat evaluation.     - OTOLARYNGOLOGY REFERRAL    2. Routine general medical  examination at a health care facility  Routine screening laboratory study and breast cancer screening indicated for baseline evaluation.   - CBC with platelets; Future  - Mammogram, routine screening; Future    3. Vitamin D deficiency  Patient requested vitamin D level as the patient does appreciate some mood changes in the winter. Laboratory order placed.   - Vitamin D Deficiency; Future    4. Hypothyroidism, unspecified type  Laboratory order placed for reevaluation of the quality of control of the patient's chronic hypothyroidism. Prescription refill provided at this time for continued management.   - TSH with free T4 reflex; Future  - levothyroxine (SYNTHROID/LEVOTHROID) 75 MCG tablet; Take 1 tablet (75 mcg) by mouth daily  Dispense: 90 tablet; Refill: 3    5. Screen for STD (sexually transmitted disease)  Patient notes that she has a new sexual partner and is interested in HIV testing solely. She denied wanting any other STD screening laboratory orders.   - HIV Antigen Antibody Combo; Future    6. Anxiety  Patient's anxiety has been worsening slightly recently. Prescription refill provided for symptomatic use. She does have an appointment with a therapist in about one month for further evaluation and intervention.   - sertraline (ZOLOFT) 25 MG tablet; Take 1 tablet (25 mg) by mouth daily  Dispense: 90 tablet; Refill: 3    7. Small red papule on right cheek   Patient should consult dermatology for further assessment of this spot.        Follow-up: Return in about 1 year (around 2/16/2019).         Scribe Disclosure:  INoelle, am serving as a scribe to document services personally performed by Alfa Lundberg MD at this visit, based upon the provider's statements to me. All documentation has been reviewed by the aforementioned provider prior to being entered into the official medical record.     Portions of this medical record were completed by a scribe. UPON MY REVIEW AND AUTHENTICATION BY  ELECTRONIC SIGNATURE, this confirms (a) I performed the applicable clinical services, and (b) the record is accurate.   Alfa Lundberg MD

## 2018-02-16 NOTE — PATIENT INSTRUCTIONS
[facility]: [phone]     [facility] Medication Refill Request Information:  * Please contact your pharmacy regarding ANY request for medication refills.  ** [facility] Prescription Fax = [phone]  * Please allow 3 business days for routine medication refills.  * Please allow 5 business days for controlled substance medication refills.     [facility] Test Result notification information:  *You will be notified with in 7-10 days of your appointment day regarding the results of your test.  If you are on MyChart you will be notified as soon as the provider has reviewed the results and signed off on them.

## 2018-02-16 NOTE — NURSING NOTE
Chief Complaint   Patient presents with     Physical     Established physical   Kaila Oglesby LPN 8:11 AM on 2/16/2018    Rooming Note  Health Maintenance   Health Maintenance Due   Topic Date Due     TSH Q1 YEAR 12/19/2017    All health maintenance items discussed and pended.  Kaila Oglesby LPN 8:14 AM on 2/16/2018

## 2018-02-19 NOTE — TELEPHONE ENCOUNTER
APPT INFO    Date /Time: 3/26/18   Reason for Appt: Voice complaing   Ref Provider/Clinic: Dr. Lundberg   Are there internal records? Yes/No?  IF YES, list clinic names: Yes;   [facility]   [facility]   Are there outside records? Yes/No? No   Patient Contact (Y/N) & Call Details: No   Action: Chart Reviewed

## 2018-03-16 ENCOUNTER — OFFICE VISIT (OUTPATIENT)
Dept: OBGYN | Facility: CLINIC | Age: 43
End: 2018-03-16
Attending: ORTHOPAEDIC SURGERY
Payer: COMMERCIAL

## 2018-03-16 VITALS
HEIGHT: 63 IN | SYSTOLIC BLOOD PRESSURE: 116 MMHG | HEART RATE: 106 BPM | DIASTOLIC BLOOD PRESSURE: 66 MMHG | BODY MASS INDEX: 19.49 KG/M2 | WEIGHT: 110 LBS

## 2018-03-16 DIAGNOSIS — N92.6 MISSED PERIOD: ICD-10-CM

## 2018-03-16 DIAGNOSIS — L91.8 SKIN TAG: Primary | ICD-10-CM

## 2018-03-16 DIAGNOSIS — Z11.3 SCREEN FOR STD (SEXUALLY TRANSMITTED DISEASE): ICD-10-CM

## 2018-03-16 LAB
HCG UR QL: NEGATIVE
INTERNAL QC OK POCT: YES

## 2018-03-16 PROCEDURE — 87491 CHLMYD TRACH DNA AMP PROBE: CPT | Performed by: NURSE PRACTITIONER

## 2018-03-16 PROCEDURE — 87591 N.GONORRHOEAE DNA AMP PROB: CPT | Performed by: NURSE PRACTITIONER

## 2018-03-16 PROCEDURE — 81025 URINE PREGNANCY TEST: CPT | Mod: ZF | Performed by: NURSE PRACTITIONER

## 2018-03-16 PROCEDURE — G0463 HOSPITAL OUTPT CLINIC VISIT: HCPCS | Mod: ZF

## 2018-03-16 ASSESSMENT — PAIN SCALES - GENERAL: PAINLEVEL: NO PAIN (0)

## 2018-03-16 NOTE — MR AVS SNAPSHOT
"              After Visit Summary   3/16/2018    Anastasia Colbert    MRN: [id number]           Patient Information     Date Of Birth          1975        Visit Information        Provider Department      3/16/2018 8:30 AM Isi Bartlett APRN [facility]        Today's Diagnoses     Missed period    -  1      Care Instructions    May try Preparation H hemorrhoid cream for relief of itching, pain, and irritation.    Notify provide if you desire a referral to the colorectal service for further follow-up and treatment.    Gonorrhea & chlamydia testing were done today.          Follow-ups after your visit        Your next 10 appointments already scheduled     Mar 26, 2018  9:15 AM CDT   (Arrive by 9:00 AM)   MA SCREENING DIGITAL BILATERAL with [email or web address]   [facility] ([facility] [place])    [address] [place] [address]  2nd [facility] [address] [phone]           Do not use any powder, lotion or deodorant under your arms or on your breast. If you do, we will ask you to remove it before your exam.  Wear comfortable, two-piece clothing.  If you have any allergies, tell your care team.  Bring any previous mammograms from other facilities or have them mailed to the breast center. Three-dimensional (3D) mammograms are available at [place] locations in [facility], [facility], [place], [place], and [place]. [facility] locations include [place] and [facility] in [place]. Benefits of 3D mammograms include: - Improved rate of cancer detection - Decreases your chance of having to go back for more tests, which means fewer: - \"False-positive\" results (This means that there is an abnormal area but it isn't cancer.) - Invasive testing procedures, such as a biopsy or surgery - Can provide clearer images of the breast if you have dense breast tissue. 3D mammography is an optional exam that anyone " can have with a 2D mammogram. It doesn't replace or take the place of a 2D mammogram. 2D mammograms remain an effective screening test for all women.  Not all insurance companies cover the cost of a 3D mammogram. Check with your insurance.            Mar 26, 2018 10:10 AM CDT   (Arrive by 9:55 AM)   NEW VOICE with Isi Aguiar MD   [facility])    [address]  4th Floor  [facility] [address] [phone]           This is a multi-disciplinary care team visit as patients with your type of problem are usually seen by a team of an MD and a Speech-Language Pathologist (who is a specialist in disorders of the voice, throat, and breathing).  Please plan about 2 hours for your visit, which will likely include Laryngeal Function Studies, a Voice/Swallow/Breathing Evaluation, and an Endoscopic Laryngeal Examination to provide a comprehensive evaluation.  Please check with your insurance company to ensure you are covered for these services. - It is important to know that if you are evaluated and/or treated by both a physician and a speech pathologist during your visit, your billing will reflect the input that you receive from both providers as separate professionals. Although most insurance plans do cover these services, we encourage you to contact your insurance company prior to your visit to determine whether there are any coverage limitations that might affect you financially. - Billing/procedure codes that are frequently associated with visits to our clinic include (but are not limited to) the ones listed below. Most patients will not need all of these items, but it may be useful to ask your insurance company's patient . 29024: Flexible fiberoptic laryngoscopy, 38233: Laryngoscopy; flexible or rigid fiberoptic, with stroboscopy, 82259: Flexible endoscopic evaluation of swallowing, 74771: Laryngeal function aerodynamic  "evaluation, 51012: Evaluation of Voice and Resonance, 19669: Speech pathology treatment for voice, speech, communication, 16744: Speech pathology treatment for swallowing/oral function for feeding - If you have any concerns or questions, or if you would prefer not to have a speech pathologist involved in your visit, please contact our Clinic Coordinator at [phone], at least 24 hours prior to your appointment.            Mar 26, 2018 10:10 AM CDT   (Arrive by 9:55 AM)   [facility] Speech Pathology and ENT Evaluation with  Ent Dysphonia Slp Provider    [facility] [employer] [facility] ([facility])    [address]  4th [facility] [address] [phone]              Who to contact     Please call your clinic at [phone] to:    Ask questions about your health    Make or cancel appointments    Discuss your medicines    Learn about your test results    Speak to your doctor            Additional Information About Your Visit        [employer] Information     [employer] gives you secure access to your electronic health record. If you see a primary care provider, you can also send messages to your care team and make appointments. If you have questions, please call your primary care clinic.  If you do not have a primary care provider, please call [phone] and they will assist you.      [employer] is an electronic gateway that provides easy, online access to your medical records. With [employer], you can request a clinic appointment, read your test results, renew a prescription or communicate with your care team.     To access your existing account, please contact your [facility] or call [phone] for assistance.        Care EveryWhere ID     This is your Care EveryWhere ID. This could be used by other organizations to access your [facility] medical records  [id number]        Your Vitals Were     Pulse Height Last Period BMI (Body Mass Index)          106 1.6 m (5' 3\") " 02/10/2018 19.49 kg/m2         Blood Pressure from Last 3 Encounters:   03/16/18 116/66   02/16/18 96/64   10/11/17 109/63    Weight from Last 3 Encounters:   03/16/18 49.9 kg (110 lb)   02/16/18 49.5 kg (109 lb 3.2 oz)   10/11/17 51.8 kg (114 lb 4.8 oz)              We Performed the Following     hCG qual urine POCT          Today's Medication Changes          These changes are accurate as of 3/16/18  8:58 AM.  If you have any questions, ask your nurse or doctor.               Stop taking these medicines if you haven't already. Please contact your care team if you have questions.     metroNIDAZOLE 0.75 % vaginal gel   Commonly known as:  METROGEL           metroNIDAZOLE 500 MG tablet   Commonly known as:  FLAGYL           paragard intrauterine copper                    Primary Care Provider Office Phone # Fax #    Alfa Lundberg [phone]       [address] [address]        Equal Access to Services     [facility] AH: Hadii tk ku hadasho Soomaali, waaxda luqadaha, qaybta kaalmada adeegyada, waxay cainin hayflor martines . So [facility] [phone].    ATENCIÓN: Si habla español, tiene a tamayo disposición servicios gratuitos de asistencia lingüística. Llame al [phone].    We comply with applicable federal civil rights laws and [place] laws. We do not discriminate on the basis of race, color, national origin, age, disability, sex, sexual orientation, or gender identity.            Thank you!     Thank you for choosing [facility]  for your care. Our goal is always to provide you with excellent care. Hearing back from our patients is one way we can continue to improve our services. Please take a few minutes to complete the written survey that you may receive in the mail after your visit with us. Thank you!             Your Updated Medication List - Protect others around you: Learn how to safely use, store and throw away your medicines at  www.disposemymeds.org.          This list is accurate as of 3/16/18  8:58 AM.  Always use your most recent med list.                   Brand Name Dispense Instructions for use Diagnosis    CLARITIN PO      Take 1 tablet by mouth.    Shortness of breath, Diarrhea, Nausea with vomiting, Chest pain, unspecified, Abdominal pain, epigastric, Palpitations, Hypotension, Sweaty palms, Tachycardia, Sweats, sweating, excessive       fluticasone 27.5 MCG/SPRAY spray    VERAMYST     Spray 2 sprays into both nostrils daily    Missed period       levothyroxine 75 MCG tablet    SYNTHROID/LEVOTHROID    90 tablet    Take 1 tablet (75 mcg) by mouth daily    Hypothyroidism, unspecified type       rifaximin 550 MG Tabs tablet    XIFAXAN    42 tablet    Take 1 tablet (550 mg) by mouth 3 times daily    Bacterial overgrowth syndrome       sertraline 25 MG tablet    ZOLOFT    90 tablet    Take 1 tablet (25 mg) by mouth daily    Anxiety

## 2018-03-16 NOTE — PROGRESS NOTES
"SUBJECTIVE:  Anastasia Colbert is a 42 yr old female, , who presents to clinic for evaluation for three small skin colored bumps on her perineum. She noticed the bumps 3 days ago, but is unsure how long they have been there for. She denies pain, itching or burning with the bumps. She denies changes in detergent or underwear. She denies an increase in vaginal discharge, vaginal itching, or  or vaginal odor. Denies any systemic symptoms, including body aches, fever, or chills. She has no history of STDs. She has one partner and her partner has one additional partner. She has no know exposures to STD's. Her partner had a vasectomy, she does not use any other form of contraception.   She reports that her period is a few days late. She has a regular period every month, \"like clock-work.\" Desires pregnancy test today.    She also reports increase in pain and irritation with a hemorrhoid. She has had the hemorrhoid for 10 years.     OBJECTIVE:   /66  Pulse 106  Ht 1.6 m (5' 3\")  Wt 49.9 kg (110 lb)  LMP 02/10/2018  BMI 19.49 kg/m2  General: pleasant female in no acute distress  Psych: normal mentation    Pelvic Exam:  EG/BUS: Genital architecture without erythema or abnormal secretions, Bartholin's and [place]'s glands normal; small soft, smooth, skin-colored lesion on posterior aspect of labia majora consistent in appearance with skin tag   Vulva: normal hair distribution, no adenopathy  Vagina: Moist, pink  Rectal exam: small soft, pink, non-tender hemorrhoid at anal opening      UPT: negative     ASSESSMENT:  Encounter Diagnoses   Name Primary?     Missed period      Skin tag Yes     Screen for STD (sexually transmitted disease)      PLAN:   Orders Placed This Encounter   Procedures     hCG qual urine POCT     Recommend Preparation H cream for itching and irritation with hemorrhoid.   Discussed referral to colorectal service for further follow up and treatment if hemorrhoid worsens or does not improve with " cream.   Gonorrhea and chlamydia tests completed today.   Reviewed with patient that her genital skin lesion is most consistent in appearance with a skin tag.  These are benign and no follow-up is needed.  Patient to return to clinic if this becomes bothersome.  Return to clinic if no return of menses within 2 months.     Isi Bartlett, KELLY, APRN, WHNP

## 2018-03-16 NOTE — PATIENT INSTRUCTIONS
May try Preparation H hemorrhoid cream for relief of itching, pain, and irritation.    Notify provide if you desire a referral to the colorectal service for further follow-up and treatment.    Gonorrhea & chlamydia testing were done today.

## 2018-03-16 NOTE — NURSING NOTE
Chief Complaint   Patient presents with     RECHECK   bumps in vaginal area wanted to be checked out.  Recently noticed.  does shave.

## 2018-03-26 ENCOUNTER — RADIANT APPOINTMENT (OUTPATIENT)
Dept: MAMMOGRAPHY | Facility: CLINIC | Age: 43
End: 2018-03-26
Payer: COMMERCIAL

## 2018-03-26 ENCOUNTER — OFFICE VISIT (OUTPATIENT)
Dept: OTOLARYNGOLOGY | Facility: CLINIC | Age: 43
End: 2018-03-26
Payer: COMMERCIAL

## 2018-03-26 ENCOUNTER — PRE VISIT (OUTPATIENT)
Dept: OTOLARYNGOLOGY | Facility: CLINIC | Age: 43
End: 2018-03-26

## 2018-03-26 DIAGNOSIS — R49.0 MUSCLE TENSION DYSPHONIA: Primary | ICD-10-CM

## 2018-03-26 DIAGNOSIS — Z00.00 ROUTINE GENERAL MEDICAL EXAMINATION AT A HEALTH CARE FACILITY: ICD-10-CM

## 2018-03-26 DIAGNOSIS — J38.7 IRRITABLE LARYNX: ICD-10-CM

## 2018-03-26 DIAGNOSIS — R49.0 DYSPHONIA: Primary | ICD-10-CM

## 2018-03-26 ASSESSMENT — PAIN SCALES - GENERAL: PAINLEVEL: NO PAIN (0)

## 2018-03-26 NOTE — PATIENT INSTRUCTIONS
1.  You were seen in the ENT Clinic today by Dr. Aguiar.  If you have any questions or concerns after your appointment, please call [phone].  Press option #1 for scheduling related needs.  Press option #3 for Nurse advice.  2.  Plan is to return to clinic as needed.  3. Please initiate speech therapy at the [facility] - [facility].  Call Denise at [phone] if you require assistance in scheduling.      Alejandra ESCOBEDON, RN  [facility] ENT   Head & Neck Surgery

## 2018-03-26 NOTE — LETTER
3/26/2018       RE: Anastasia Colbert  [address]  [place] [address]     Dear Colleague,    Thank you for referring your patient, Anastasia Colbert, to the [facility]. Please see a copy of my visit note below.    [facility]  Kapil Tsang Jr., M.D., F.A.C.S.  Isi Aguiar M.D., M.P.H.  Ana Gillespie, Ph.D., CCC/SLP  Yolanda Jeffers M.M. (voice), M.A., CCC/SLP  Davis Manzanares M.M. (voice), M.A., CCC/SLP    Evaluation report    Clinician: Yolanda Jeffers M.M. (voice), M.A., CCC/SLP  Seen in conjunction with: Dr. Aguiar  Referring physician:  Tamika  Patient: Anastasia Colbert  Date of Visit: 3/26/2018    HISTORY  Chief complaint: Anastasia Colbert is a 42 year old presenting today for evaluation of dysphonia.  Salient history: She has a history significant for dysphonia, and was evaluated by Dr. Sanchez in 2015.  At that time, he recommended that she consider a trial course of speech therapy and anti-reflux medication.  Onset: long standing, even since high school, but gradually worsening.  Inciting incident: none  Course: gradually worsening.    CURRENT SYMPTOMS INCLUDE  VOICE    Speaking voice: fatigue toward the end of a typical day.      Preparing for a presentation recently and her voice was totally fatigued towards the end.      Partner (he) has a difficult time hearing her when speaking, noreen. in loud environment.      Recalls seeing Dr. Sanchez a couple years ago, but she did not feel that reflux was the issue since there were no remarkable signs/symptoms present.  Referred to speech, but at the time it was difficult to work into her schedule.    Singing  Voice: substancially worse.      Had a  when young, and recalls that there was always a point in her register break in around E-F4 that was strange.      Over the years, she will try to sing and nothing comes out, difficulty singing quietly.    While  in highschool her  thought she was speaking in a very low register, and that she may be straining voice.  Sang soprano at the the time.    Occasionally students cannot hear her in therapy sessions, but ok for the most part.    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  She also enjoys singing pop, etc on her own; used to do more, but schedule got busy and voice was becoming problematic.     COUGH/THROAT CLEARING    Also started in HS: feels PND gets caught in the back of her throat somewhere.  Taking loraditine year round currently.  Seems to help, but probably not helpful.  Tried the neilmed bottle in the past and seemed to help a bit.    Can only breathe out of left nostril, right is tight    Only a little phlegm comes up. Usually clear sticky if anything comes up.     SWALLOWING    Denies issues    RESPIRATION    Only out of one side of her noset    OTHER PERTINENT HISTORY    Hypothyroidism  - stable    Otherwise unknown.  Please also refer to Dr. Aguiar's dictation.     Past Medical History:   Diagnosis Date     Abnormal glandular Papanicolaou smear of cervix 2001    LEEP     Allergic rhinitis      Anxiety 2000     Depressive disorder 1992     Depressive disorder, not elsewhere classified     hx of as teen (hosp 1993)     Eating disorder, unspecified     Hx of      Hoarseness a few years     Intestinal bacterial overgrowth      Unspecified hypothyroidism     Borderline- TSH 5.20 6/19/06. On levoxyl 50 mcg 44/05     Past Surgical History:   Procedure Laterality Date     C ORAL SURGERY PROCEDURE  1989    impacted bicuspids     C PSYCHIATRIC SERVICE/THERAPY  1993    depression     COLONOSCOPY  2013     COLPOSCOPY,LOOP ELECTRD CERVIX EXCIS  2001    normal 6/19/06     HC BREATH HYDROGEN TEST  3/21/2013    Procedure: HYDROGEN BREATH TEST;  Surgeon: Matt Briceno MD;  Location:  GI       OBJECTIVE  PATIENT REPORTED MEASURES    Effort to talk: 1 / 10 (0-10 in  "which 10 represents maximal effort)    Effort to sin / 10 (0-10 in which 10 represents maximal effort)    Voice quality: 3 / 10 (0-10 in which 10 represents best possible voice)  Patient Supplied Answers To VHI Questionnaire  Voice Handicap Index (VHI-10) 3/20/2018   My voice makes it difficult for people to hear me 2   People have difficulty understanding me in a noisy room 2   My voice difficulties restrict my personal and social life.  2   I feel left out of conversations because of my voice 0   My voice problem causes me to lose income 0   I feel as though I have to strain to produce voice 2   The clarity of my voice is unpredictable 2   My voice problem upsets me 2   My voice makes me feel handicapped 0   People ask, \"What's wrong with your voice?\" 1   VHI-10 13       Patient Supplied Answers To CSI Questionnaire  Cough Severity Index (CSI) 3/20/2018   My cough is worse when I lie down 0   My coughing problem causes me to restrict my personal and social life 0   I tend to avoid places because of my cough problem 0   I feel embarrassed because of my coughing problem 1   People ask, ''What's wrong?'' because I cough a lot 2   I run out of air when I cough 0   My coughing problem affects my voice 3   My coughing problem limits my physical activity 0   My coughing problem upsets me 2   People ask me if I am sick because I cough a lot 2   CSI Score 10       Patient Supplied Answers To EAT Questionnaire  Eating Assessment Tool (EAT-10) 3/20/2018   My swallowing problem has caused me to lose weight 0   My swallowing problem interferes with my ability to go out for meals 0   Swallowing liquids takes extra effort 0   Swallowing solids takes extra effort 0   Swallowing pills takes extra effort 0   Swallowing is painful 0   The pleasure of eating is affected by my swallowing 0   When I swallow food sticks in my throat 0   I cough when I eat 1   Swallowing is stressful 0   EAT-10 1     PERCEPTUAL EVALUATION (CPT " 05646)  POSTURE / TENSION:     upper body    BREATHING:     appears within normal limits and adequate      Not consistently coordinated with speech    LARYNGEAL PALPATION:     supple musculature    reduced thyrohyoid space    no significant tenderness    VOICE:    Roughness: Mild Intermittent    Breathiness: Mild to moderate Consistent    Strain: WNL    Loudness    Conversational speech:  Mild to moderately reduced    Projected speech:  Mildly reduced    Pitch:    Conversational speech:  Mildly lowered    Pitch glide: neurologically normal; light and breathy at the top appears to be due to insufficient coordination of breath and phonation    Resonance:    Conversational speech:  backward focus of resonance    Singing vs. Speech:  Singing voice is slightly improved    CAPE-V Overall Severity:  31/100    COUGH/THROAT CLEARING:    Complains of frequent throat clearing    Dry    Locus of cough/ throat clear: sounds consistent with upper airway    LARYNGEAL EXAMINATION  Procedure: Flexible endoscopy with chip-tip technology with stroboscopy, left nostril; topical anesthesia with 3% Lidocaine and 0.25% phenylephrine was applied.   Performed by: Dr. Aguiar   The laryngeal and pharyngeal structures were evaluated for gross appearance, mobility, function, and focal lesions / abnormalities of the associated mucosa.  Stroboscopy was warranted to evaluate closure, symmetry, and vibratory characteristics of the vocal folds.  All findings were within normal limits with the exception of the following salient features:     Mild to moderate sticky, opaque secretions present that often collect on the surface of the vocal folds, but clear with a cough    Mild bilateral bowing of the vocal folds; possible slight hyperabduction    Mild to moderate AP supraglottic constriction during connected speech tasks.    THERAPY PROBES: Improvement was elicited with use of forward resonant stimuli, coordination of respiration and phonation, use of  glottic coup to promote vocal fold closure and use of inhalation phonation    The addition of stroboscopy allowed evaluation of the mucosal wave:    Amplitude: right: mildly decreased; left: mildly decreased.     Symmetry: intermittent symmetry.    Closure pattern: spindle-shaped, but can be normal.     Closure plane: at glottic level.     Phase distribution: open-phase predominant.       Slight hyperabduction is intermittently observed      Fully abducted    The laryngeal exam was reviewed with Ms. Colbert, and I provided pertinent explanations, as well as written and oral information.    ASSESSMENT / PLAN  IMPRESSIONS: Anastasia Colbert is a 42 year old female, presenting today with R49.0 (Dysphonia). Dysphonia/discomfort is compounded by the hyperfunction and imbalanced function of the intrinsic and extrinsic laryngeal musculature  . She feels equally bothered by her dysphonia and throat clearing, although the latter was minimally observed today.  Dr. Aguiar recommended that she discontinue her daily use of Claritin to     STIMULABILITY: results of therapy probes during perceptual and laryngeal evaluation demonstrate improvement with use of forward resonant stimuli, coordination of respiration and phonation, use of glottic coup to promote vocal fold closure and use of inhalation phonation    RECOMMENDATIONS:     A course of speech therapy is recommended to optimize vocal technique, improve voice quality, promote reduced discomfort, effort and fatigue and help reduce chronic cough, throat clear and mucosal irritation.    She demonstrates a Good prognosis for improvement given adherence to therapeutic recommendations.     Positive indicators: positive response to therapy probes diagnosis is known to respond to treatment high level of comittment good awareness of patterns of use    Negative indicators: Ms. Colbert and I acknowledged that there may be some voice identity conflict that she will need to work through  to find an optimal speaking voice that meets her personal goals.    DURATION / FREQUENCY: 2 one-hour weekly sessions, followed by 2 one-hour biweekly sessions.  A total of 6-8 sessions may be necessary.    Therapeutic information was provided related to MTD, ILS and speech therapy.  Therapeutic probes for cough suppression were also provided.    GOALS:  Patient goal:   To improve and maintain a healthy voice quality  To understand the problem and fix it as much as possible  To have a normal and acceptable voice quality  To reduce her cough/ throat clearing to acceptable levels    Long-term goal(s): In 6 months, Ms. Colbert will:  1. Report a week of typical vocal activities, in which dysphonia and discomfort do not exceed a level of 3 out of 10, 80% of the time   2. Report a week with no more than 3 episodes of coughing/ throat clearing, that do not last more than 3 seconds    This treatment plan was developed with the patient who agreed with the recommendations.    TOTAL SERVICE TIME: 60 minutes  EVALUATION OF VOICE AND RESONANCE: (13957): 60 minutes    NO CHARGE FACILITY FEE (93628)    Yolanda Jeffers M.M. (voice), M.A., CCC/SLP  Speech-Language Pathologist  Certificate of Vocology  [facility]  [phone]  [email or web address]  Prounouns: she/her

## 2018-03-26 NOTE — MR AVS SNAPSHOT
After Visit Summary   3/26/2018    Anastasia Colbetr    MRN: [id number]           Patient Information     Date Of Birth          1975        Visit Information        Provider Department      3/26/2018 10:10 AM Yolanda Jeffers, SLP [facility]        Today's Diagnoses     Dysphonia    -  1       Follow-ups after your visit        Who to contact     Please call your clinic at [phone] to:    Ask questions about your health    Make or cancel appointments    Discuss your medicines    Learn about your test results    Speak to your doctor            Additional Information About Your Visit        MyChart Information     [employer] gives you secure access to your electronic health record. If you see a primary care provider, you can also send messages to your care team and make appointments. If you have questions, please call your primary care clinic.  If you do not have a primary care provider, please call [phone] and they will assist you.      [employer] is an electronic gateway that provides easy, online access to your medical records. With [employer], you can request a clinic appointment, read your test results, renew a prescription or communicate with your care team.     To access your existing account, please contact your [facility] or call [phone] for assistance.        Care EveryWhere ID     This is your Care EveryWhere ID. This could be used by other organizations to access your [place] medical records  [id number]         Blood Pressure from Last 3 Encounters:   03/16/18 116/66   02/16/18 96/64   10/11/17 109/63    Weight from Last 3 Encounters:   03/16/18 49.9 kg (110 lb)   02/16/18 49.5 kg (109 lb 3.2 oz)   10/11/17 51.8 kg (114 lb 4.8 oz)              We Performed the Following     C BEHAVIORAL & QUALITATIVE ANALYSIS VOICE AND RESONANCE        Primary Care Provider Office Phone # Fax #    Alfa Lundberg [phone]  [facility] [address]  [facility] [address]        Equal Access to Services     ROBERTA RODGERS : Hadii aad ku hadernestomaximilian Queenieali, watirsoda patyjaciha, qaybta manuellalitosaritha tran, manny ferreiragonzalojovanny youssef. So [facility] [phone].    ATENCIÓN: Si habla español, tiene a tamayo disposición servicios gratuitos de asistencia lingüística. Thangame al [phone].    We comply with applicable federal civil rights laws and [place] laws. We do not discriminate on the basis of race, color, national origin, age, disability, sex, sexual orientation, or gender identity.            Thank you!     Thank you for choosing [facility]  for your care. Our goal is always to provide you with excellent care. Hearing back from our patients is one way we can continue to improve our services. Please take a few minutes to complete the written survey that you may receive in the mail after your visit with us. Thank you!             Your Updated Medication List - Protect others around you: Learn how to safely use, store and throw away your medicines at www.disposemymeds.org.          This list is accurate as of 3/26/18  3:35 PM.  Always use your most recent med list.                   Brand Name Dispense Instructions for use Diagnosis    CLARITIN PO      Take 1 tablet by mouth.    Shortness of breath, Diarrhea, Nausea with vomiting, Chest pain, unspecified, Abdominal pain, epigastric, Palpitations, Hypotension, Sweaty palms, Tachycardia, Sweats, sweating, excessive       fluticasone 27.5 MCG/SPRAY spray    VERAMYST     Spray 2 sprays into both nostrils daily    Missed period       levothyroxine 75 MCG tablet    SYNTHROID/LEVOTHROID    90 tablet    Take 1 tablet (75 mcg) by mouth daily    Hypothyroidism, unspecified type       rifaximin 550 MG Tabs tablet    XIFAXAN    42 tablet    Take 1 tablet (550 mg) by mouth 3 times daily    Bacterial overgrowth syndrome       sertraline 25 MG tablet    ZOLOFT    90 tablet    Take 1 tablet (25 mg) by mouth  daily    Anxiety

## 2018-03-26 NOTE — LETTER
3/26/2018      RE: Anastasia Colbert  [address]  [place] [address]       Dear Dr. Lundberg:    I had the pleasure of meeting Anastasia Colbert in consultation at the [facility] of the [facility] at your request, for evaluation of dysphonia, chronic cough, chronic throat-clearing and globus sensation. The note from our visit follows.  I appreciate the opportunity to participate in the care of this pleasant patient.    Please feel free to contact me with any questions.    Sincerely yours,    Isi Aguiar M.D., M.P.H.  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 Symptoms include increased effort to talk/sing, mucus in throat, frequent throat-clearing, poor voice quality, voice tires easily, voice breaks, raspy voice and scratchy voice.    Voice  She reports a voice problem over the past few years, that began gradually without an obvious inciting events.  The symptoms are worse in the evenings and after heavy voice use.  The problem seems stable over time.  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  She notes significantly increased singing effort, less so for speaking effort.  Her voice is sometimes normal.  Prior ENT evaluation was negative and a trial of PPIs was considered.  She feels that she has a chronic difficulty with postnasal drip in her throat that requires her to cough it up.  This is a year-round problem.    Swallowing  No concerns    Cough/Throat-clearing  She reports chronic cough/throat  clearing since high school.  The problem began gradually and is always the same.  She attributes it primarily to postnasal drainage and notes that it is mostly controllable.  She was previously seen by Dr. Sanchez for these concerns, has not previously done speech therapy.    Breathing  No concerns    Throat discomfort  She reports throat discomfort associated with the symptoms described above.    Reflux-type symptoms  She never experiences heartburn/indigestion. She is not taking reflux medications.    Prior EPIC records were reviewed for this visit.    MEDICATIONS:     Current Outpatient Prescriptions   Medication Sig Dispense Refill     fluticasone (VERAMYST) 27.5 MCG/SPRAY spray Spray 2 sprays into both nostrils daily       levothyroxine (SYNTHROID/LEVOTHROID) 75 MCG tablet Take 1 tablet (75 mcg) by mouth daily 90 tablet 3     sertraline (ZOLOFT) 25 MG tablet Take 1 tablet (25 mg) by mouth daily 90 tablet 3     rifaximin (XIFAXAN) 550 MG TABS Take 1 tablet (550 mg) by mouth 3 times daily 42 tablet 11     Loratadine (CLARITIN PO) Take 1 tablet by mouth.       ALLERGIES:    Allergies   Allergen Reactions     Augmentin [Penicillins] Rash     Ondansetron Other (See Comments)     Tetracycline Rash     Bactrim [Sulfamethoxazole W/Trimethoprim] Rash     Metronidazole Anxiety and Nausea and Vomiting     Zofran [Methylparaben-Ondansetron-Propylpar] Anxiety     PAST MEDICAL HISTORY:   Past Medical History:   Diagnosis Date     Abnormal glandular Papanicolaou smear of cervix 2001    LEEP     Allergic rhinitis      Anxiety 2000     Depressive disorder 1992     Depressive disorder, not elsewhere classified     hx of as teen (hosp 1993)     Eating disorder, unspecified     Hx of      Hoarseness a few years     Intestinal bacterial overgrowth      Unspecified hypothyroidism     Borderline- TSH 5.20 6/19/06. On levoxyl 50 mcg 44/05      PAST SURGICAL HISTORY:   Past Surgical History:   Procedure Laterality Date     C ORAL SURGERY  PROCEDURE  1989    impacted bicuspids     C PSYCHIATRIC SERVICE/THERAPY  1993    depression     COLONOSCOPY  2013     COLPOSCOPY,LOOP ELECTRD CERVIX EXCIS  2001    normal 6/19/06     HC BREATH HYDROGEN TEST  3/21/2013    Procedure: HYDROGEN BREATH TEST;  Surgeon: Matt Briceno MD;  Location:  GI     HABITS/SOCIAL HISTORY:    Social History   Substance Use Topics     Smoking status: Never Smoker     Smokeless tobacco: Never Used     Alcohol use 0.5 oz/week      Comment: Rarely (less than once per month), 1 drink on average     FAMILY HISTORY:    Family History   Problem Relation Age of Onset     Depression Mother      Gynecology Mother      hyst     Thyroid Disease Mother      hypo     Lipids Father      no meds     CANCER Maternal Grandmother      kidney     Other Cancer Maternal Grandmother      kidney cancer     Depression Maternal Grandmother      Thyroid Disease Maternal Grandmother      Depression Maternal Grandfather      CANCER Paternal Grandmother      bladder     Genitourinary Problems Paternal Grandmother      bladder     Other Cancer Paternal Grandmother      bladder cancer     Arthritis Paternal Grandfather      CEREBROVASCULAR DISEASE Paternal Grandfather      ,     HEART DISEASE Paternal Grandfather      Dementia Paternal Grandfather      Other Cancer Paternal Grandfather      unknown origin     Depression Maternal Uncle      Thyroid Disease Maternal Aunt      hypo     REVIEW OF SYSTEMS:  The patient completed a comprehensive 11 point review of systems (below), which was reviewed. Positives are as noted below; pertinent findings are as noted in the HPI.     Patient Supplied Answers to Review of Systems   ENT ROS 3/20/2018   Ears, Nose, Throat Nasal congestion or drainage, Hoarseness     PHYSICAL EXAMINATION:  General: The patient was alert and conversant, and in no acute distress.    Eyes: PERRL, conjunctiva and lids normal, sclera nonicteric.  Nose: Anterior rhinoscopy: no gross  abnormalities. no  bleeding; no  mucopurulence; septum grossly normal, mild mucoid drainage and/or crusting.  Oral cavity/oropharynx: No masses or lesions. Dentition in good condition. Floor of mouth and oral tongue soft to palpation. Tongue mobility and palate elevation intact and symmetric.  Ears: Normal auricles, external auditory canals bilaterally. Visualized portions of tympanic membranes normal bilaterally.   Neck: No palpable cervical lymphadenopathy. There was no significant tenderness to palpation of the thyrohyoid space, which was mildly narrow. No obvious thyroid abnormality. Landmarks palpable.  Bilateral posterior neck tightness and tension.  Resp: Breathing comfortably, no stridor or stertor.  Neuro: Symmetric facial function. Other cranial nerves as documented above.  Psych: Normal affect, pleasant and cooperative.  Voice/speech: Mild dysphonia characterized by low Io, mild breathiness.  Extremities: No cyanosis, clubbing, or edema of the upper extremities.    Intake scores  Total Score for Last Patient-Answered VHI Questionnaire  VHI Total Score 3/20/2018   VHI Total Score 13   Answers for HPI/ROS submitted by the patient on 3/20/2018   VPCMEAN: 2.37    Total Score for Last Patient-Answered EAT Questionnaire  EAT Total Score 3/20/2018   EAT Total Score 1     Total Score for Last Patient-Answered CSI Questionnaire  CSI Total Score 3/20/2018   CSI Total Score 6     PROCEDURE:   Flexible fiberoptic laryngoscopy and laryngovideostroboscopy  Indications: This procedure was warranted to evaluate the patient's laryngeal anatomy and function. Risks, benefits, and alternatives were discussed.  Description: After written informed consent was obtained, a time-out was performed to confirm patient identity, procedure, and procedure site. Topical 3% lidocaine with 0.25% phenylephrine was applied to the nasal cavities. I performed the endoscopy and no complications were apparent. Continuous and stroboscopic light  were utilized to assess routine phonation and variable frequency phonation.  Performed by: Isi Aguiar MD MPH  SLP: Yolanda Jeffers MM, MA, CCC-SLP  Findings: Normal nasopharynx. Normal base of tongue, valleculae, and epiglottis. Vocal fold mobility: right: normal; left: normal. Medial edges of the vocal folds: mildly bowed. No focal mucosal lesions were observed on the true vocal folds. Glissade produced appropriate elongation. There was moderate A-P supraglottic recruitment with connected speech. Mucosa of false vocal folds, aryepiglottic folds, piriform sinuses, and posterior glottis unremarkable. Airway was patent. Response to the therapy probes was good.      The addition of stroboscopy allowed evaluation of the mucosal wave and glottic closure.   Amplitude: right: normal; left: normal. Symmetry: good symmetry. Closure pattern: complete. Closure plane: at glottic level. Phase distribution: normal.    IMPRESSION AND PLAN:   Anastasia Colbert presents with muscle tension dysphonia and irritable larynx syndrome.    I recommended speech therapy as initial primary management, with goals including improving laryngeal hygiene, reducing laryngeal irritability, improving laryngeal efficiency and improving respiratory/phonatory coordination.  I also recommended a trial off of Claritin, as it may be having drying effects make her secretions more sticky, and she is taking them purely for her throat symptoms.    If her posterior neck tension proves to be a barrier for progress in speech therapy, we can consider a referral to physical therapy for myofascial release of the neck and upper shoulders.     She will return as needed. I appreciate the opportunity to participate in the care of this patient.              no

## 2018-03-26 NOTE — PROGRESS NOTES
[facility] VOICE CLINIC  Kapil Tsang Jr., M.D., F.A.C.S.  Isi Aguiar M.D., M.P.H.  Ana Gillespie, Ph.D., CCC/SLP  Yolanda Jeffers M.M. (voice), M.A., CCC/SLP  Davis Manzanares M.M. (voice), M.A., CCC/SLP    Evaluation report    Clinician: Yolanda Jeffers M.M. (voice), M.A., CCC/SLP  Seen in conjunction with: Dr. Aguiar  Referring physician:  Tamika  Patient: Anastasia Colbert  Date of Visit: 3/26/2018    HISTORY  Chief complaint: Anastasia Colbert is a 42 year old presenting today for evaluation of dysphonia.  Salient history: She has a history significant for dysphonia, and was evaluated by Dr. Sanchez in 2015.  At that time, he recommended that she consider a trial course of speech therapy and anti-reflux medication.  Onset: long standing, even since high school, but gradually worsening.  Inciting incident: none  Course: gradually worsening.    CURRENT SYMPTOMS INCLUDE  VOICE    Speaking voice: fatigue toward the end of a typical day.      Preparing for a presentation recently and her voice was totally fatigued towards the end.      Partner (he) has a difficult time hearing her when speaking, noreen. in loud environment.      Recalls seeing Dr. Sanchez a couple years ago, but she did not feel that reflux was the issue since there were no remarkable signs/symptoms present.  Referred to speech, but at the time it was difficult to work into her schedule.    Singing  Voice: substancially worse.      Had a  when young, and recalls that there was always a point in her register break in around E-F4 that was strange.      Over the years, she will try to sing and nothing comes out, difficulty singing quietly.    While in highschool her  thought she was speaking in a very low register, and that she may be straining voice.  Sang soprano at the the time.    Occasionally students cannot hear her in therapy sessions, but ok for the most part.    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  She also enjoys singing pop, etc on her own; used to do more, but schedule got busy and voice was becoming problematic.     COUGH/THROAT CLEARING    Also started in HS: feels PND gets caught in the back of her throat somewhere.  Taking loraditine year round currently.  Seems to help, but probably not helpful.  Tried the neilmed bottle in the past and seemed to help a bit.    Can only breathe out of left nostril, right is tight    Only a little phlegm comes up. Usually clear sticky if anything comes up.     SWALLOWING    Denies issues    RESPIRATION    Only out of one side of her noset    OTHER PERTINENT HISTORY    Hypothyroidism  - stable    Otherwise unknown.  Please also refer to Dr. Aguiar's dictation.     Past Medical History:   Diagnosis Date     Abnormal glandular Papanicolaou smear of cervix     LEEP     Allergic rhinitis      Anxiety 2000     Depressive disorder      Depressive disorder, not elsewhere classified     hx of as teen (hosp )     Eating disorder, unspecified     Hx of      Hoarseness a few years     Intestinal bacterial overgrowth      Unspecified hypothyroidism     Borderline- TSH 5.20 06. On levoxyl 50 mcg /     Past Surgical History:   Procedure Laterality Date     C ORAL SURGERY PROCEDURE      impacted bicuspids     C PSYCHIATRIC SERVICE/THERAPY      depression     COLONOSCOPY       COLPOSCOPY,LOOP ELECTRD CERVIX EXCIS  2001    normal 06     HC BREATH HYDROGEN TEST  3/21/2013    Procedure: HYDROGEN BREATH TEST;  Surgeon: Matt Briceno MD;  Location:  GI       OBJECTIVE  PATIENT REPORTED MEASURES    Effort to talk: 1 / 10 (0-10 in which 10 represents maximal effort)    Effort to sin / 10 (0-10 in which 10 represents maximal effort)    Voice quality: 3 / 10 (0-10 in which 10 represents best possible voice)  Patient Supplied Answers To VHI Questionnaire  Voice Handicap Index (VHI-10) 3/20/2018   My voice makes it  "difficult for people to hear me 2   People have difficulty understanding me in a noisy room 2   My voice difficulties restrict my personal and social life.  2   I feel left out of conversations because of my voice 0   My voice problem causes me to lose income 0   I feel as though I have to strain to produce voice 2   The clarity of my voice is unpredictable 2   My voice problem upsets me 2   My voice makes me feel handicapped 0   People ask, \"What's wrong with your voice?\" 1   VHI-10 13       Patient Supplied Answers To CSI Questionnaire  Cough Severity Index (CSI) 3/20/2018   My cough is worse when I lie down 0   My coughing problem causes me to restrict my personal and social life 0   I tend to avoid places because of my cough problem 0   I feel embarrassed because of my coughing problem 1   People ask, ''What's wrong?'' because I cough a lot 2   I run out of air when I cough 0   My coughing problem affects my voice 3   My coughing problem limits my physical activity 0   My coughing problem upsets me 2   People ask me if I am sick because I cough a lot 2   CSI Score 10       Patient Supplied Answers To EAT Questionnaire  Eating Assessment Tool (EAT-10) 3/20/2018   My swallowing problem has caused me to lose weight 0   My swallowing problem interferes with my ability to go out for meals 0   Swallowing liquids takes extra effort 0   Swallowing solids takes extra effort 0   Swallowing pills takes extra effort 0   Swallowing is painful 0   The pleasure of eating is affected by my swallowing 0   When I swallow food sticks in my throat 0   I cough when I eat 1   Swallowing is stressful 0   EAT-10 1     PERCEPTUAL EVALUATION (CPT 92828)  POSTURE / TENSION:     upper body    BREATHING:     appears within normal limits and adequate      Not consistently coordinated with speech    LARYNGEAL PALPATION:     supple musculature    reduced thyrohyoid space    no significant tenderness    VOICE:    Roughness: Mild " Intermittent    Breathiness: Mild to moderate Consistent    Strain: WNL    Loudness    Conversational speech:  Mild to moderately reduced    Projected speech:  Mildly reduced    Pitch:    Conversational speech:  Mildly lowered    Pitch glide: neurologically normal; light and breathy at the top appears to be due to insufficient coordination of breath and phonation    Resonance:    Conversational speech:  backward focus of resonance    Singing vs. Speech:  Singing voice is slightly improved    CAPE-V Overall Severity:  31/100    COUGH/THROAT CLEARING:    Complains of frequent throat clearing    Dry    Locus of cough/ throat clear: sounds consistent with upper airway    LARYNGEAL EXAMINATION  Procedure: Flexible endoscopy with chip-tip technology with stroboscopy, left nostril; topical anesthesia with 3% Lidocaine and 0.25% phenylephrine was applied.   Performed by: Dr. Aguiar   The laryngeal and pharyngeal structures were evaluated for gross appearance, mobility, function, and focal lesions / abnormalities of the associated mucosa.  Stroboscopy was warranted to evaluate closure, symmetry, and vibratory characteristics of the vocal folds.  All findings were within normal limits with the exception of the following salient features:     Mild to moderate sticky, opaque secretions present that often collect on the surface of the vocal folds, but clear with a cough    Mild bilateral bowing of the vocal folds; possible slight hyperabduction    Mild to moderate AP supraglottic constriction during connected speech tasks.    THERAPY PROBES: Improvement was elicited with use of forward resonant stimuli, coordination of respiration and phonation, use of glottic coup to promote vocal fold closure and use of inhalation phonation    The addition of stroboscopy allowed evaluation of the mucosal wave:    Amplitude: right: mildly decreased; left: mildly decreased.     Symmetry: intermittent symmetry.    Closure pattern: spindle-shaped,  but can be normal.     Closure plane: at glottic level.     Phase distribution: open-phase predominant.       Slight hyperabduction is intermittently observed      Fully abducted    The laryngeal exam was reviewed with Ms. Colbert, and I provided pertinent explanations, as well as written and oral information.    ASSESSMENT / PLAN  IMPRESSIONS: Anastasia Colbert is a 42 year old female, presenting today with R49.0 (Dysphonia). Dysphonia/discomfort is compounded by the hyperfunction and imbalanced function of the intrinsic and extrinsic laryngeal musculature  . She feels equally bothered by her dysphonia and throat clearing, although the latter was minimally observed today.  Dr. Aguiar recommended that she discontinue her daily use of Claritin to     STIMULABILITY: results of therapy probes during perceptual and laryngeal evaluation demonstrate improvement with use of forward resonant stimuli, coordination of respiration and phonation, use of glottic coup to promote vocal fold closure and use of inhalation phonation    RECOMMENDATIONS:     A course of speech therapy is recommended to optimize vocal technique, improve voice quality, promote reduced discomfort, effort and fatigue and help reduce chronic cough, throat clear and mucosal irritation.    She demonstrates a Good prognosis for improvement given adherence to therapeutic recommendations.     Positive indicators: positive response to therapy probes diagnosis is known to respond to treatment high level of comittment good awareness of patterns of use    Negative indicators: Ms. Colbert and I acknowledged that there may be some voice identity conflict that she will need to work through to find an optimal speaking voice that meets her personal goals.    DURATION / FREQUENCY: 2 one-hour weekly sessions, followed by 2 one-hour biweekly sessions.  A total of 6-8 sessions may be necessary.    Therapeutic information was provided related to MTD, ILS and speech therapy.   Therapeutic probes for cough suppression were also provided.    GOALS:  Patient goal:   To improve and maintain a healthy voice quality  To understand the problem and fix it as much as possible  To have a normal and acceptable voice quality  To reduce her cough/ throat clearing to acceptable levels    Long-term goal(s): In 6 months, Ms. Colbert will:  1. Report a week of typical vocal activities, in which dysphonia and discomfort do not exceed a level of 3 out of 10, 80% of the time   2. Report a week with no more than 3 episodes of coughing/ throat clearing, that do not last more than 3 seconds    This treatment plan was developed with the patient who agreed with the recommendations.    TOTAL SERVICE TIME: 60 minutes  EVALUATION OF VOICE AND RESONANCE: (00829): 60 minutes    NO CHARGE FACILITY FEE (68528)    Yoalnda Jeffers M.M. (voice), M.A., CCC/SLP  Speech-Language Pathologist  Certificate of Vocology  [facility]  [phone]  [email or web address]  Prounouns: she/her

## 2018-03-26 NOTE — PROGRESS NOTES
Dear Dr. Lundberg:    I had the pleasure of meeting Anastasia Colbert in consultation at the [facility] of the [facility] at your request, for evaluation of dysphonia, chronic cough, chronic throat-clearing and globus sensation. The note from our visit follows.  I appreciate the opportunity to participate in the care of this pleasant patient.    Please feel free to contact me with any questions.    Sincerely yours,    Isi Aguiar M.D., M.P.H.  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 Symptoms include increased effort to talk/sing, mucus in throat, frequent throat-clearing, poor voice quality, voice tires easily, voice breaks, raspy voice and scratchy voice.      Voice  She reports a voice problem over the past few years, that began gradually without an obvious inciting events.  The symptoms are worse in the evenings and after heavy voice use.  The problem seems stable over time.  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  She notes significantly increased singing effort, less so for speaking effort.  Her voice is sometimes normal.  Prior ENT evaluation was negative and a trial of PPIs was considered.  She feels that she has a chronic difficulty with postnasal drip in her throat that requires her to cough it up.  This is a year-round problem.      Swallowing  No concerns      Cough/Throat-clearing  She reports chronic cough/throat clearing since high school.  The problem began gradually and is always the same.  She  attributes it primarily to postnasal drainage and notes that it is mostly controllable.  She was previously seen by Dr. Sanchez for these concerns, has not previously done speech therapy.      Breathing  No concerns      Throat discomfort  She reports throat discomfort associated with the symptoms described above.      Reflux-type symptoms  She never experiences heartburn/indigestion. She is not taking reflux medications.      Prior EPIC records were reviewed for this visit.    MEDICATIONS:     Current Outpatient Prescriptions   Medication Sig Dispense Refill     fluticasone (VERAMYST) 27.5 MCG/SPRAY spray Spray 2 sprays into both nostrils daily       levothyroxine (SYNTHROID/LEVOTHROID) 75 MCG tablet Take 1 tablet (75 mcg) by mouth daily 90 tablet 3     sertraline (ZOLOFT) 25 MG tablet Take 1 tablet (25 mg) by mouth daily 90 tablet 3     rifaximin (XIFAXAN) 550 MG TABS Take 1 tablet (550 mg) by mouth 3 times daily 42 tablet 11     Loratadine (CLARITIN PO) Take 1 tablet by mouth.         ALLERGIES:    Allergies   Allergen Reactions     Augmentin [Penicillins] Rash     Ondansetron Other (See Comments)     Tetracycline Rash     Bactrim [Sulfamethoxazole W/Trimethoprim] Rash     Metronidazole Anxiety and Nausea and Vomiting     Zofran [Methylparaben-Ondansetron-Propylpar] Anxiety       PAST MEDICAL HISTORY:   Past Medical History:   Diagnosis Date     Abnormal glandular Papanicolaou smear of cervix 2001    LEEP     Allergic rhinitis      Anxiety 2000     Depressive disorder 1992     Depressive disorder, not elsewhere classified     hx of as teen (hosp 1993)     Eating disorder, unspecified     Hx of      Hoarseness a few years     Intestinal bacterial overgrowth      Unspecified hypothyroidism     Borderline- TSH 5.20 6/19/06. On levoxyl 50 mcg 44/05        PAST SURGICAL HISTORY:   Past Surgical History:   Procedure Laterality Date     C ORAL SURGERY PROCEDURE  1989    impacted bicuspids     C PSYCHIATRIC SERVICE/THERAPY   1993    depression     COLONOSCOPY  2013     COLPOSCOPY,LOOP ELECTRD CERVIX EXCIS  2001    normal 6/19/06     HC BREATH HYDROGEN TEST  3/21/2013    Procedure: HYDROGEN BREATH TEST;  Surgeon: Matt Briceno MD;  Location:  GI       HABITS/SOCIAL HISTORY:    Social History   Substance Use Topics     Smoking status: Never Smoker     Smokeless tobacco: Never Used     Alcohol use 0.5 oz/week      Comment: Rarely (less than once per month), 1 drink on average         FAMILY HISTORY:    Family History   Problem Relation Age of Onset     Depression Mother      Gynecology Mother      hyst     Thyroid Disease Mother      hypo     Lipids Father      no meds     CANCER Maternal Grandmother      kidney     Other Cancer Maternal Grandmother      kidney cancer     Depression Maternal Grandmother      Thyroid Disease Maternal Grandmother      Depression Maternal Grandfather      CANCER Paternal Grandmother      bladder     Genitourinary Problems Paternal Grandmother      bladder     Other Cancer Paternal Grandmother      bladder cancer     Arthritis Paternal Grandfather      CEREBROVASCULAR DISEASE Paternal Grandfather      ,     HEART DISEASE Paternal Grandfather      Dementia Paternal Grandfather      Other Cancer Paternal Grandfather      unknown origin     Depression Maternal Uncle      Thyroid Disease Maternal Aunt      hypo       REVIEW OF SYSTEMS:  The patient completed a comprehensive 11 point review of systems (below), which was reviewed. Positives are as noted below; pertinent findings are as noted in the HPI.     Patient Supplied Answers to Review of Systems   ENT ROS 3/20/2018   Ears, Nose, Throat Nasal congestion or drainage, Hoarseness       PHYSICAL EXAMINATION:  General: The patient was alert and conversant, and in no acute distress.    Eyes: PERRL, conjunctiva and lids normal, sclera nonicteric.  Nose: Anterior rhinoscopy: no gross abnormalities. no  bleeding; no  mucopurulence; septum grossly normal,  mild mucoid drainage and/or crusting.  Oral cavity/oropharynx: No masses or lesions. Dentition in good condition. Floor of mouth and oral tongue soft to palpation. Tongue mobility and palate elevation intact and symmetric.  Ears: Normal auricles, external auditory canals bilaterally. Visualized portions of tympanic membranes normal bilaterally.   Neck: No palpable cervical lymphadenopathy. There was no significant tenderness to palpation of the thyrohyoid space, which was mildly narrow. No obvious thyroid abnormality. Landmarks palpable.  Bilateral posterior neck tightness and tension.  Resp: Breathing comfortably, no stridor or stertor.  Neuro: Symmetric facial function. Other cranial nerves as documented above.  Psych: Normal affect, pleasant and cooperative.  Voice/speech: Mild dysphonia characterized by low Io, mild breathiness.  Extremities: No cyanosis, clubbing, or edema of the upper extremities.      Intake scores  Total Score for Last Patient-Answered VHI Questionnaire  VHI Total Score 3/20/2018   VHI Total Score 13   Answers for HPI/ROS submitted by the patient on 3/20/2018   VPCMEAN: 2.37    Total Score for Last Patient-Answered EAT Questionnaire  EAT Total Score 3/20/2018   EAT Total Score 1     Total Score for Last Patient-Answered CSI Questionnaire  CSI Total Score 3/20/2018   CSI Total Score 6       PROCEDURE:   Flexible fiberoptic laryngoscopy and laryngovideostroboscopy  Indications: This procedure was warranted to evaluate the patient's laryngeal anatomy and function. Risks, benefits, and alternatives were discussed.  Description: After written informed consent was obtained, a time-out was performed to confirm patient identity, procedure, and procedure site. Topical 3% lidocaine with 0.25% phenylephrine was applied to the nasal cavities. I performed the endoscopy and no complications were apparent. Continuous and stroboscopic light were utilized to assess routine phonation and variable frequency  phonation.  Performed by: Isi Aguiar MD MPH  SLP: Yolanda Jeffers MM, MA, CCC-SLP  Findings: Normal nasopharynx. Normal base of tongue, valleculae, and epiglottis. Vocal fold mobility: right: normal; left: normal. Medial edges of the vocal folds: mildly bowed. No focal mucosal lesions were observed on the true vocal folds. Glissade produced appropriate elongation. There was moderate A-P supraglottic recruitment with connected speech. Mucosa of false vocal folds, aryepiglottic folds, piriform sinuses, and posterior glottis unremarkable. Airway was patent. Response to the therapy probes was good.      The addition of stroboscopy allowed evaluation of the mucosal wave and glottic closure.   Amplitude: right: normal; left: normal. Symmetry: good symmetry. Closure pattern: complete. Closure plane: at glottic level. Phase distribution: normal.      IMPRESSION AND PLAN:   Anastasia Colbert presents with muscle tension dysphonia and irritable larynx syndrome.    I recommended speech therapy as initial primary management, with goals including improving laryngeal hygiene, reducing laryngeal irritability, improving laryngeal efficiency and improving respiratory/phonatory coordination.  I also recommended a trial off of Claritin, as it may be having drying effects make her secretions more sticky, and she is taking them purely for her throat symptoms.    If her posterior neck tension proves to be a barrier for progress in speech therapy, we can consider a referral to physical therapy for myofascial release of the neck and upper shoulders.     She will return as needed. I appreciate the opportunity to participate in the care of this patient.

## 2018-03-26 NOTE — MR AVS SNAPSHOT
After Visit Summary   3/26/2018    Anastasia Colbert    MRN: [id number]           Patient Information     Date Of Birth          1975        Visit Information        Provider Department      3/26/2018 10:10 AM Isi Aguiar MD [facility] Ear Nose and Throat        Today's Diagnoses     Muscle tension dysphonia    -  1    Irritable larynx          Care Instructions    1.  You were seen in the ENT Clinic today by Dr. Aguiar.  If you have any questions or concerns after your appointment, please call [phone].  Press option #1 for scheduling related needs.  Press option #3 for Nurse advice.  2.  Plan is to return to clinic as needed.  3. Please initiate speech therapy at the [facility].  Call Denise at [phone] if you require assistance in scheduling.      Alejandra PATRICK, RN  [facility] ENT   Head & Neck Surgery               Follow-ups after your visit        Additional Services     OTOLARYNGOLOGY REFERRAL       SPEECH-LANGUAGE PATHOLOGY SERVICE(S) REQUESTED:  Evaluate and treat    Ana Gillespie, Ph.D., CCC/SLP  Speech-Language Pathologist  Director, [facility]  [phone]    Yolanda Jeffers M.M., M.A., CCC/SLP  Speech-Language Pathologist  [facility]  [phone]                  Your next 10 appointments already scheduled     Apr 19, 2018  1:00 PM CDT   (Arrive by 12:45 PM)   RETURN SLP VOICE with [facility] ([facility])    [address] [place] [address] [phone]            Apr 26, 2018  3:00 PM CDT   (Arrive by 2:45 PM)   RETURN SLP VOICE with [facility] ([facility])    [address] [place] [address] [phone]            May 10, 2018  3:00 PM CDT   (Arrive by 2:45 PM)   RETURN SLP VOICE with [facility] (  [facility])    [address] [id number] [phone]            May 24, 2018  3:00 PM CDT   (Arrive by 2:45 PM)   RETURN SLP VOICE with [facility] ([facility])    [address] [id number] [phone]              Who to contact     Please call your clinic at [phone] to:    Ask questions about your health    Make or cancel appointments    Discuss your medicines    Learn about your test results    Speak to your doctor            Additional Information About Your Visit        BadAbroadharDrivr Information     Backdoor gives you secure access to your electronic health record. If you see a primary care provider, you can also send messages to your care team and make appointments. If you have questions, please call your primary care clinic.  If you do not have a primary care provider, please call [phone] and they will assist you.      Backdoor is an electronic gateway that provides easy, online access to your medical records. With Backdoor, you can request a clinic appointment, read your test results, renew a prescription or communicate with your care team.     To access your existing account, please contact your [facility] or call [phone] for assistance.        Care EveryWhere ID     This is your Care EveryWhere ID. This could be used by other organizations to access your [facility] medical records  [id number]         Blood Pressure from Last 3 Encounters:   03/16/18 116/66   02/16/18 96/64   10/11/17 109/63    Weight from Last 3 Encounters:   03/16/18 49.9 kg (110 lb)   02/16/18 49.5 kg (109 lb 3.2 oz)   10/11/17 51.8 kg (114 lb 4.8 oz)              We Performed the Following     IMAGESTREAM RECORDING ORDER     LARYNGOSCOPY FLEX/RIGID W STROBOSCOPY     OTOLARYNGOLOGY REFERRAL        Primary Care Provider Office Phone # Fax #    Alfa HARRINGTON  MD Tamika [phone]       [address] [address]        Equal Access to Services     ROBERTA RODGERS : Hadii aad ku hadernestomaximilian Janinabetsy, lorensaritha newmanjaciha, alex kasheng tran, manny cainin hayaamorales louielukas morrison bobbi youssef. So [facility] [phone].    ATENCIÓN: Si habla español, tiene a tamayo disposición servicios gratuitos de asistencia lingüística. Llame al [phone].    We comply with applicable federal civil rights laws and [place] laws. We do not discriminate on the basis of race, color, national origin, age, disability, sex, sexual orientation, or gender identity.            Thank you!     Thank you for choosing [facility] EAR NOSE AND THROAT  for your care. Our goal is always to provide you with excellent care. Hearing back from our patients is one way we can continue to improve our services. Please take a few minutes to complete the written survey that you may receive in the mail after your visit with us. Thank you!             Your Updated Medication List - Protect others around you: Learn how to safely use, store and throw away your medicines at www.disposemymeds.org.          This list is accurate as of 3/26/18 11:59 PM.  Always use your most recent med list.                   Brand Name Dispense Instructions for use Diagnosis    CLARITIN PO      Take 1 tablet by mouth.    Shortness of breath, Diarrhea, Nausea with vomiting, Chest pain, unspecified, Abdominal pain, epigastric, Palpitations, Hypotension, Sweaty palms, Tachycardia, Sweats, sweating, excessive       fluticasone 27.5 MCG/SPRAY spray    VERAMYST     Spray 2 sprays into both nostrils daily    Missed period       levothyroxine 75 MCG tablet    SYNTHROID/LEVOTHROID    90 tablet    Take 1 tablet (75 mcg) by mouth daily    Hypothyroidism, unspecified type       rifaximin 550 MG Tabs tablet    XIFAXAN    42 tablet    Take 1 tablet (550 mg) by mouth 3 times daily    Bacterial overgrowth syndrome       sertraline 25 MG  tablet    ZOLOFT    90 tablet    Take 1 tablet (25 mg) by mouth daily    Anxiety

## 2018-04-19 ENCOUNTER — OFFICE VISIT (OUTPATIENT)
Dept: OTOLARYNGOLOGY | Facility: CLINIC | Age: 43
End: 2018-04-19
Payer: COMMERCIAL

## 2018-04-19 DIAGNOSIS — R49.0 DYSPHONIA: Primary | ICD-10-CM

## 2018-04-19 NOTE — LETTER
"4/19/2018       RE: Anastasia Colbert  [address]  [place] [address]     Dear Colleague,    Thank you for referring your patient, Anastasia Colbert, to the [facility] VOICE at [facility]. Please see a copy of my visit note below.    [facility] VOICE CLINIC  THERAPY NOTE (CPT 99510)    Patient: Anastasia Colbert  Date of Service: 4/19/2018  Referring physician: Dr. Aguiar  Impressions from most recent evaluation (3/26/18):  \"IMPRESSIONS: Anastasia Colbert is a 42 year old female, presenting today with R49.0 (Dysphonia). Dysphonia/discomfort is compounded by the hyperfunction and imbalanced function of the intrinsic and extrinsic laryngeal musculature  . She feels equally bothered by her dysphonia and throat clearing, although the latter was minimally observed today.  Dr. Aguiar recommended that she discontinue her daily use of Claritin to avoid over drying her secretions.\"     SUBJECTIVE:  Since her last session, Ms. Colbert reports the following:     Stopped the allergy med, which did not make a big change, so she will continue to stay off of Claritin    Still has mucus that is not as tenacious    Mild to moderate fatigue and roughness (little more marked at the end of the day)     Coughing and throat clearing is WNL    OBJECTIVE:  Ms. Colbert presents today with the following:  VOICE:    Roughness: Mild Intermittent    Breathiness: Mild to moderate Consistent    Strain: WNL    Loudness    Conversational speech:  Mild to moderately reduced    Projected speech:  Mildly reduced    COUGH/THROAT CLEARING:    frequent throat clearing; improving    Dry    Locus of cough/ throat clear: sounds consistent with upper airway    PATIENT REPORTED MEASURES:  Patient Supplied Answers To SLP QOL Questionnaire  Therapy Quality of Life 4/19/2018   Since my l ast session, I used the speech therapy exercises and strategies as recommended by my speech pathologist. Not " "applicable   I feel that using my therapy techniques has become a habit Not applicable   I feel confident in my ability to manage my current and future symptoms. Agree   Since my last session I feel my symptoms have --------. Stayed the same   Overall, since starting therapy I feel my symptoms are --------. About the same     THERAPEUTIC ACTIVITIES  Exercises and techniques for optimal vocal hygiene including:    Systemic hydration, including strategies for increasing daily water intake    Topical hydration - Gargling (saline and plain water), saline nasal irrigation, humidification, steam, guaifenesin to reduce the thickened secretions / laryngeal irritation.    Awareness and reduction of phonotraumatic behaviors    Moderating voice use    Substituting non-voice alternative behaviors    Avoiding cough and throat clearing    Chronic cough / throat clearing reduction therapy    she is most bothered by: throat clearing    Suppression and substitution strategies were instructed including    Swallowing substitution techniques    Breathing suppression techniques to reduce laryngeal tension    Low impact glottic coup and soft cough    Techniques to raise awareness of habitual throat clearing    Exercises to promote optimal respiratory mechanics    Practiced in a prone and supine position on the massage table, with tactile cue of a hand on the low rib-cage to facilitate awareness of low respiratory engagement.  Progressed to steated today.    With clinician support, patient was able to demonstrate improved abdominal relaxation and engagement on inhalation    Optimal exhalation using inward engagement of the abdominal wall with no corresponding collapse of the upper chest cavity was trained using the pulsed \"sh\" task    [place] a respiratory pacing exercise; this was helpful    acceptable improvement in airflow and respiratory mechanics    [place] techniques to incorporate it into her daily mindfulness " "practice.    Semi-Occluded Vocal Tract (SOVT) exercises instructed to reduce laryngeal tension, promote vocal fold pliability, and coordinate respiration and phonation    /z/ or Straw phonation with water resistance was found to be most facilitating     Sustained phonation, and voice vs. voiceless productions used to promote easy voicing and raise awareness of laryngeal tension    Ascending and descending glides utilized to promote vocal fold pliability    \"Messa di voce\", gradual crescendo and decrescendo to vary medial compression was also utilized to promote vocal fold pliability.    Instructed on the benefits of using these exercises for improved coordination of breath flow with phonation and tissue mobilization.    Instructed on the importance of using these exercises as a warm-up / cool down,  and to re-calibrate the voice throughout the day.    Exercises in techniques for improved airflow during phonation    Speech material with /ju/ glides was facilitating at the word level.    Progressed to easy onset/ flow, and blending phrases    [place] techniques to reduce glottal jimenez and improve breath flow; negative practice improved awareness today.    Exercises to reduce base of tongue tension    Instructed in exercises to reduce base of tongue tension; this was helpful.    Counseling and Education:    I provided a summary,     an audio recording and handouts of today's therapeutic activities to facilitate practice.    ASSESSMENT/PLAN  PROGRESS TOWARD LONG TERM GOALS:   Minimal at this point, as this is first session, but good learning today    IMPRESSIONS: R49.0 (Dysphonia), and mild throat irritation.     PLAN: I will see Ms. Colbert in 2 weeks  For practice goals see AVS.     TOTAL SERVICE TIME: 60 minutes  TREATMENT (64417): 60 minutes  NO CHARGE FACILITY FEE (33903)    Yolanda Jeffers M.M. (voice), M.A., CCC/SLP  Speech-Language Pathologist  Certificate of Vocology  [facility] " "Clinic  [phone]  [email or web address]  Prounouns: she/her      After Visit Summary    Patient: Anastasia Colbert  Date of Visit: 2018    Hygiene:     Systemic Hydration: internal hydration of the entire body  o Keep sipping regularly      Topical Hydration: hydration for the surface mucosa of the larynx and vocal folds.  o Nasal Irrigation/Nasal Spray  o Gargling    Cough:    Sip of water     Gargle  o With a voice  o Tilt your head side to side  o [place] chirp -  kamanuelkaaa kakakaaa     Swallow    Breathe in through rounded lips + out with a repeated  sh   + swallow    Suck on a lozenge with Pectin (avoid mint or menthol) or a sugar-free candy, gum, \"wet\" snacks (apples, pineapple, grapes, etc).  Also consider Xylitol products, like the Spry brand of lozenges, gum, spray    Breathing:    In the morning and evening (twice daily) for 2 minutes:   o Breathe while lying on your back with your face and knees up. Hands on tummy and chest.  Take a breath in with rounded lips and exhale with a  Sh    o Inhale  = Inflate; exhale = deflate  o 3x each: try breathin in/8 out, 5/10, 3/4  o Throughout the day (2-3x/day for just a couple minutes) check breathing while keeping shoulders relaxed (riding to and from school, etc.)      Breathing Tips:  o Keep shoulders down  o Don t overextend your neck    Voice (2-3x/day unless otherwise noted):    Semi-occluded vocal tract exercise:   o ZZZZZZzzz/ lip buzzes/Bubbles (straw in 1.5 to 2  of water) 3-4x/day for 1-2 min:  o 3x: blow bubbles and add a sustained  who  or an  oo  (comfortable pitch )  o 3x: blow bubbles and vary  who  gliding up and down             Up and down like a sine wave  o 3x: blow bubbles on a sustained/ varied pitch soft to loud to soft (messa di voce)    o 1-2x: Happy birthday bubbles (keep connected)  These exercises are great for:    *warm up / cool down - Part of the morning routing and before and after extended voice use.    *tissue " "mobilization exercise - Improving the condition and pliability of the vocal folds.    *Abdominal breathing and applying optimal breath flow to speech/singing.        u  words (2-3 x per day)  o 5 words with use of arm   o Breathe first and add a  yawn & sigh  shape to sound    Try with a bit of twang     Spacious speech phrases  (2-3x per day)  o Second column - H+ vowel combinations (watch the tongue)  o First column - through \"Hello...\"    Tongue stretches  o See handout    : Denise \"Son\" Keith [phone]/ [email or web address]    Yolanda Jeffers M.M. (voice), M.A., CCC/SLP  Speech-Language Pathologist  Certificate of Vocology  [facility]  [phone]  [email or web address]  Prounouns: she/her          "

## 2018-04-19 NOTE — PROGRESS NOTES
"After Visit Summary    Patient: Anastasia Colbert  Date of Visit: 2018    Hygiene:     Systemic Hydration: internal hydration of the entire body  o Keep sipping regularly      Topical Hydration: hydration for the surface mucosa of the larynx and vocal folds.  o Nasal Irrigation/Nasal Spray  o Gargling    Cough:    Sip of water     Gargle  o With a voice  o Tilt your head side to side  o [place] chirp -  ratnaaa ratnaaa     Swallow    Breathe in through rounded lips + out with a repeated  sh   + swallow    Suck on a lozenge with Pectin (avoid mint or menthol) or a sugar-free candy, gum, \"wet\" snacks (apples, pineapple, grapes, etc).  Also consider Xylitol products, like the Spry brand of lozenges, gum, spray    Breathing:    In the morning and evening (twice daily) for 2 minutes:   o Breathe while lying on your back with your face and knees up. Hands on tummy and chest.  Take a breath in with rounded lips and exhale with a  Sh    o Inhale  = Inflate; exhale = deflate  o 3x each: try breathin in/8 out, 5/10, 3/4  o Throughout the day (2-3x/day for just a couple minutes) check breathing while keeping shoulders relaxed (riding to and from school, etc.)      Breathing Tips:  o Keep shoulders down  o Don t overextend your neck    Voice (2-3x/day unless otherwise noted):    Semi-occluded vocal tract exercise:   o ZZZZZZzzz/ lip buzzes/Bubbles (straw in 1.5 to 2  of water) 3-4x/day for 1-2 min:  o 3x: blow bubbles and add a sustained  who  or an  oo  (comfortable pitch )  o 3x: blow bubbles and vary  who  gliding up and down             Up and down like a sine wave  o 3x: blow bubbles on a sustained/ varied pitch soft to loud to soft (messa di voce)    o 1-2x: Happy birthday bubbles (keep connected)  These exercises are great for:    *warm up / cool down - Part of the morning routing and before and after extended voice use.    *tissue mobilization exercise - Improving the condition and pliability of the vocal " "folds.    *Abdominal breathing and applying optimal breath flow to speech/singing.        u  words (2-3 x per day)  o 5 words with use of arm   o Breathe first and add a  yawn & sigh  shape to sound    Try with a bit of twang     Spacious speech phrases  (2-3x per day)  o Second column - H+ vowel combinations (watch the tongue)  o First column - through \"Hello...\"    Tongue stretches  o See handout    : Denise \"Son\" Keith [phone]/ [email or web address]    Yolanda Jeffers M.M. (voice), M.A., CCC/SLP  Speech-Language Pathologist  Certificate of Vocology  [facility]  [phone]  [email or web address]  Prounouns: she/her    "

## 2018-04-19 NOTE — MR AVS SNAPSHOT
After Visit Summary   4/19/2018    Anastasia Colbert    MRN: [id number]           Patient Information     Date Of Birth          1975        Visit Information        Provider Department      4/19/2018 1:00 PM Yolanda eJffers [facility]LP [facility]        Today's Diagnoses     Dysphonia    -  1       Follow-ups after your visit        Your next 10 appointments already scheduled     May 01, 2018  2:00 PM CDT   (Arrive by 1:45 PM)   RETURN SLP VOICE with [facility] ([facility])    [address] [place] [id number] [phone]            May 10, 2018  3:00 PM CDT   (Arrive by 2:45 PM)   RETURN SLP VOICE with [facility] ([facility])    [address] [place] [id number] [phone]            May 24, 2018  3:00 PM CDT   (Arrive by 2:45 PM)   RETURN SLP VOICE with [facility] ([facility])    [address] [place] [address] [phone]              Who to contact     Please call your clinic at [phone] to:    Ask questions about your health    Make or cancel appointments    Discuss your medicines    Learn about your test results    Speak to your doctor            Additional Information About Your Visit        MyChart Information     [employer] gives you secure access to your electronic health record. If you see a primary care provider, you can also send messages to your care team and make appointments. If you have questions, please call your primary care clinic.  If you do not have a primary care provider, please call [phone] and they will assist you.      VSporto is an electronic gateway that provides easy, online access to your medical records. With VSporto, you can request a clinic appointment, read your test results, renew a prescription or communicate with  your care team.     To access your existing account, please contact your [facility] or call [phone] for assistance.        Care EveryWhere ID     This is your Care EveryWhere ID. This could be used by other organizations to access your [facility] medical records  [id number]         Blood Pressure from Last 3 Encounters:   03/16/18 116/66   02/16/18 96/64   10/11/17 109/63    Weight from Last 3 Encounters:   03/16/18 49.9 kg (110 lb)   02/16/18 49.5 kg (109 lb 3.2 oz)   10/11/17 51.8 kg (114 lb 4.8 oz)              We Performed the Following     SPEECH/HEARING THERAPY, INDIVIDUAL        Primary Care Provider Office Phone # Fax #    Alfa Lundberg [phone]       [address] [address]        Equal Access to Services     [facility]: Hadii aad ku hadasho Soomaali, waaxda luqadaha, qaybta kaalmada adeegyada, waxay cainin hayaan adelukas martines . So [facility] [phone].    ATENCIÓN: Si habla español, tiene a tamayo disposición servicios gratuitos de asistencia lingüística. Llame al [phone].    We comply with applicable federal civil rights laws and [place] laws. We do not discriminate on the basis of race, color, national origin, age, disability, sex, sexual orientation, or gender identity.            Thank you!     Thank you for choosing  [facility] VOICE  for your care. Our goal is always to provide you with excellent care. Hearing back from our patients is one way we can continue to improve our services. Please take a few minutes to complete the written survey that you may receive in the mail after your visit with us. Thank you!             Your Updated Medication List - Protect others around you: Learn how to safely use, store and throw away your medicines at www.disposemymeds.org.          This list is accurate as of 4/19/18 11:59 PM.  Always use your most recent med list.                   Brand Name Dispense Instructions for use  Diagnosis    CLARITIN PO      Take 1 tablet by mouth.    Shortness of breath, Diarrhea, Nausea with vomiting, Chest pain, unspecified, Abdominal pain, epigastric, Palpitations, Hypotension, Sweaty palms, Tachycardia, Sweats, sweating, excessive       fluticasone 27.5 MCG/SPRAY spray    VERAMYST     Spray 2 sprays into both nostrils daily    Missed period       levothyroxine 75 MCG tablet    SYNTHROID/LEVOTHROID    90 tablet    Take 1 tablet (75 mcg) by mouth daily    Hypothyroidism, unspecified type       rifaximin 550 MG Tabs tablet    XIFAXAN    42 tablet    Take 1 tablet (550 mg) by mouth 3 times daily    Bacterial overgrowth syndrome       sertraline 25 MG tablet    ZOLOFT    90 tablet    Take 1 tablet (25 mg) by mouth daily    Anxiety

## 2018-04-19 NOTE — PROGRESS NOTES
"[facility] VOICE CLINIC  THERAPY NOTE (CPT 82966)    Patient: Anastasia Colbert  Date of Service: 4/19/2018  Referring physician: Dr. Aguiar  Impressions from most recent evaluation (3/26/18):  \"IMPRESSIONS: Anastasia Colbert is a 42 year old female, presenting today with R49.0 (Dysphonia). Dysphonia/discomfort is compounded by the hyperfunction and imbalanced function of the intrinsic and extrinsic laryngeal musculature  . She feels equally bothered by her dysphonia and throat clearing, although the latter was minimally observed today.  Dr. Aguiar recommended that she discontinue her daily use of Claritin to avoid over drying her secretions.\"     SUBJECTIVE:  Since her last session, Ms. Colbert reports the following:     Stopped the allergy med, which did not make a big change, so she will continue to stay off of Claritin    Still has mucus that is not as tenacious    Mild to moderate fatigue and roughness (little more marked at the end of the day)     Coughing and throat clearing is WNL    OBJECTIVE:  Ms. Colbert presents today with the following:  VOICE:    Roughness: Mild Intermittent    Breathiness: Mild to moderate Consistent    Strain: WNL    Loudness    Conversational speech:  Mild to moderately reduced    Projected speech:  Mildly reduced    COUGH/THROAT CLEARING:    frequent throat clearing; improving    Dry    Locus of cough/ throat clear: sounds consistent with upper airway    PATIENT REPORTED MEASURES:  Patient Supplied Answers To SLP QOL Questionnaire  Therapy Quality of Life 4/19/2018   Since my l ast session, I used the speech therapy exercises and strategies as recommended by my speech pathologist. Not applicable   I feel that using my therapy techniques has become a habit Not applicable   I feel confident in my ability to manage my current and future symptoms. Agree   Since my last session I feel my symptoms have --------. Stayed the same   Overall, since starting therapy I feel my symptoms are " "--------. About the same     THERAPEUTIC ACTIVITIES  Exercises and techniques for optimal vocal hygiene including:    Systemic hydration, including strategies for increasing daily water intake    Topical hydration - Gargling (saline and plain water), saline nasal irrigation, humidification, steam, guaifenesin to reduce the thickened secretions / laryngeal irritation.    Awareness and reduction of phonotraumatic behaviors    Moderating voice use    Substituting non-voice alternative behaviors    Avoiding cough and throat clearing    Chronic cough / throat clearing reduction therapy    she is most bothered by: throat clearing    Suppression and substitution strategies were instructed including    Swallowing substitution techniques    Breathing suppression techniques to reduce laryngeal tension    Low impact glottic coup and soft cough    Techniques to raise awareness of habitual throat clearing    Exercises to promote optimal respiratory mechanics    Practiced in a prone and supine position on the massage table, with tactile cue of a hand on the low rib-cage to facilitate awareness of low respiratory engagement.  Progressed to steated today.    With clinician support, patient was able to demonstrate improved abdominal relaxation and engagement on inhalation    Optimal exhalation using inward engagement of the abdominal wall with no corresponding collapse of the upper chest cavity was trained using the pulsed \"sh\" task    Gratiot a respiratory pacing exercise; this was helpful    acceptable improvement in airflow and respiratory mechanics    Gratiot techniques to incorporate it into her daily mindfulness practice.    Semi-Occluded Vocal Tract (SOVT) exercises instructed to reduce laryngeal tension, promote vocal fold pliability, and coordinate respiration and phonation    /z/ or Straw phonation with water resistance was found to be most facilitating     Sustained phonation, and voice vs. voiceless productions used to " "promote easy voicing and raise awareness of laryngeal tension    Ascending and descending glides utilized to promote vocal fold pliability    \"Messa di voce\", gradual crescendo and decrescendo to vary medial compression was also utilized to promote vocal fold pliability.    Instructed on the benefits of using these exercises for improved coordination of breath flow with phonation and tissue mobilization.    Instructed on the importance of using these exercises as a warm-up / cool down,  and to re-calibrate the voice throughout the day.    Exercises in techniques for improved airflow during phonation    Speech material with /ju/ glides was facilitating at the word level.    Progressed to easy onset/ flow, and blending phrases    [place] techniques to reduce glottal jimenez and improve breath flow; negative practice improved awareness today.    Exercises to reduce base of tongue tension    Instructed in exercises to reduce base of tongue tension; this was helpful.    Counseling and Education:    I provided a summary,     an audio recording and handouts of today's therapeutic activities to facilitate practice.    ASSESSMENT/PLAN  PROGRESS TOWARD LONG TERM GOALS:   Minimal at this point, as this is first session, but good learning today    IMPRESSIONS: R49.0 (Dysphonia), and mild throat irritation.     PLAN: I will see Ms. Colbert in 2 weeks  For practice goals see AVS.     TOTAL SERVICE TIME: 60 minutes  TREATMENT (99375): 60 minutes  NO CHARGE FACILITY FEE (01074)    Yolanda Jeffers M.M. (voice), M.A., CCC/SLP  Speech-Language Pathologist  Certificate of Vocology  [facility]  [phone]  [email or web address]  Prounouns: she/her    "

## 2018-05-01 ENCOUNTER — OFFICE VISIT (OUTPATIENT)
Dept: OTOLARYNGOLOGY | Facility: CLINIC | Age: 43
End: 2018-05-01
Payer: COMMERCIAL

## 2018-05-01 DIAGNOSIS — R49.0 DYSPHONIA: Primary | ICD-10-CM

## 2018-05-01 NOTE — LETTER
"5/1/2018       RE: Anastasia Colbert  [address]  [place] [address]     Dear Colleague,    Thank you for referring your patient, Anastasia Colbert, to the [facility]. Please see a copy of my visit note below.    [facility] VOICE CLINIC  THERAPY NOTE (CPT 79525)    Patient: Anastasia Colbert  Date of Service: 5/1/2018  Referring physician: Dr. Aguiar  Impressions from most recent evaluation (3/26/18):  \"IMPRESSIONS: Anastasia Colbert is a 42 year old female, presenting today with R49.0 (Dysphonia). Dysphonia/discomfort is compounded by the hyperfunction and imbalanced function of the intrinsic and extrinsic laryngeal musculature  . She feels equally bothered by her dysphonia and throat clearing, although the latter was minimally observed today.  Dr. Aguiar recommended that she discontinue her daily use of Claritin to avoid over drying her secretions.\"     SUBJECTIVE:  Since her last session, Ms. Colbert reports the following:     Completing regular practice of:    Morning and evening gargling    Nasal spray    Dry cough today; occasional.    Very tenacious mucus and trying to swallow, or ignore    inconsisent with vocal exercises.    Talking  - noticing tongue base tension    Weird hacking cough - talk and laugh - feels a little spasm like.    Cough - allergy related?     OBJECTIVE:  Ms. Colbert presents today with the following:  VOICE:    Roughness: Mild Intermittent    Breathiness: Mild Consistent    Strain: WNL    Loudness    Conversational speech:  Mild to moderately reduced    Projected speech:  Mildly reduced     COUGH/THROAT CLEARING:    frequent throat clearing; improving    Dry    Locus of cough/ throat clear: sounds consistent with upper airway  PATIENT REPORTED MEASURES:  Patient Supplied Answers To SLP QOL Questionnaire  Therapy Quality of Life 4/25/2018   Since my l ast session, I used the speech therapy exercises and " "strategies as recommended by my speech pathologist. Agree   I feel that using my therapy techniques has become a habit Neither agree nor disagree   I feel confident in my ability to manage my current and future symptoms. Neither agree nor disagree   Since my last session I feel my symptoms have --------. Stayed the same   Overall, since starting therapy I feel my symptoms are --------. About the same     THERAPEUTIC ACTIVITIES    Demonstrated previous exercises.  o demonstrated improved technique  o appropriate redirection provided  o instruction provided for increased level of complexity/difficulty      Semi-Occluded Vocal Tract (SOVT) exercises instructed to reduce laryngeal tension, promote vocal fold pliability, and coordinate respiration and phonation    /z/ or Straw phonation with water resistance was found to be most facilitating     Sustained phonation, and voice vs. voiceless productions used to promote easy voicing and raise awareness of laryngeal tension    Ascending and descending glides utilized to promote vocal fold pliability    \"Messa di voce\", gradual crescendo and decrescendo to vary medial compression was also utilized to promote vocal fold pliability.    Instructed on the benefits of using these exercises for improved coordination of breath flow with phonation and tissue mobilization.    Instructed on the importance of using these exercises as a warm-up / cool down,  and to re-calibrate the voice throughout the day.    Exercises in techniques for improved airflow during phonation    Speech material with /ju/ glides was facilitating at the word level.    Progressed to easy onset/ flow phrases    Barnwell techniques to reduce glottal jimenez and improve breath flow; negative practice improved awareness today.    Modified base of tongue release exercises     1-5-1 vocal warm ups utilized moving up and down in range by 1/2 steps    Resonant Voice Therapy (RVT) exercises to promote forward locus of " "resonance and optimized pattern of laryngeal adduction    Speech material that elicits a high, forward tongue position (/n/) was most facilitating    Easy descending glide on /n/ utilized in conjunction with relaxed jaw, tongue, and lightly closed lips to facilitate forward resonant sound    Use of the carrier phrase \"nnhnn\" instructed to promote generalization to everyday speech    Syllable level using /n/ in alternation with cardinal vowels on sustained pitches and speech inflection    Word level exercises featuring nasal continuant loaded stimuli    Phrase level exercises featuring nasal continuants in more complex phonemic contexts were employed    Instructed with and interval of a descending 5th, as well as an arpeggio pattern was facilitating, prior to progressing to comfortable speech using optimal breath flow.    Negative practice was employed and was facilitative    Able to recognize improvement in quality and comfort    Counseling and Education:    Asked many questions about the nature of her symptoms, and I answered all of these thoroughly.    I provided a summary, an audio recording (iPhone) and handouts of today's therapeutic activities to facilitate practice.    ASSESSMENT/PLAN  PROGRESS TOWARD LONG TERM GOALS:   Modest progress to date, therapeutic methods have been altered to account for this; please see above    IMPRESSIONS: R49.0 (Dysphonia)     PLAN: I will see Ms. Colbert in 2 weeks.   For practice goals see AVS.     TOTAL SERVICE TIME: 60 minutes  TREATMENT (26229): 60 minutes  NO CHARGE FACILITY FEE (31999)        After Visit Summary    Patient: Anastasia Colbert  Date of Visit: 5/1/2018  Voice (2-3x/day unless otherwise noted):    Semi-occluded vocal tract exercise:     ZZZZZZzzz/ lip buzzes/Bubbles (straw in 1.5 to 2  of water) 3-4x/day for 1-2 min:    3x: blow bubbles and add a sustained  who  or an  oo  (comfortable pitch )    3x: blow bubbles and vary  who  gliding up and down             " "Up and down like a sine wave    3x: blow bubbles on a sustained/ varied pitch soft to loud to soft (messa di voce)      1-2x: Happy birthday bubbles (keep connected)  These exercises are great for:                                                    *warm up / cool down - Part of the morning routing and before and after extended voice use.                                                    *tissue mobilization exercise - Improving the condition and pliability of the vocal folds.                                                    *Abdominal breathing and applying optimal breath flow to speech/singing.         u  words (2-3 x per day)    5 words with use of arm     Breathe first and add a  yawn & sigh  shape to sound                                              Try with a bit of twang     Spacious speech phrases  (2-3x per day)    Second column - H+ vowel combinations (watch the tongue)    First column - through \"Hello...\"    Tongue stretches    See handout  Today, also completed warm ups with a 1-5-1 (ie: C4 to G4 to C 4up and down by 1/2 steps)           n  words(2-3 x per day)  o hold onto the  n  at the beginning of the word  o 5-1 (E4 to A3)    Speaking pitch should be around A3     n  sentences(2-3 x per day)  o Db4 to E4 to A3 (3-5-1 pattern)    : Denise \"Son\" Keith [phone]/ [email or web address]     Yolanda Jeffers M.M. (voice) MBaldoA., CCC/SLP  Speech-Language Pathologist  Certificate of Vocology  [facility]  [phone]  [email or web address]  Prounouns: she/her    "

## 2018-05-01 NOTE — PROGRESS NOTES
"After Visit Summary    Patient: Anastasia Colbert  Date of Visit: 5/1/2018  Voice (2-3x/day unless otherwise noted):    Semi-occluded vocal tract exercise:     ZZZZZZzzz/ lip buzzes/Bubbles (straw in 1.5 to 2  of water) 3-4x/day for 1-2 min:    3x: blow bubbles and add a sustained  who  or an  oo  (comfortable pitch )    3x: blow bubbles and vary  who  gliding up and down             Up and down like a sine wave    3x: blow bubbles on a sustained/ varied pitch soft to loud to soft (messa di voce)      1-2x: Happy birthday bubbles (keep connected)  These exercises are great for:                                                    *warm up / cool down - Part of the morning routing and before and after extended voice use.                                                    *tissue mobilization exercise - Improving the condition and pliability of the vocal folds.                                                    *Abdominal breathing and applying optimal breath flow to speech/singing.         u  words (2-3 x per day)    5 words with use of arm     Breathe first and add a  yawn & sigh  shape to sound                                              Try with a bit of twang     Spacious speech phrases  (2-3x per day)    Second column - H+ vowel combinations (watch the tongue)    First column - through \"Hello...\"    Tongue stretches    See handout  Today, also completed warm ups with a 1-5-1 (ie: C4 to G4 to C 4up and down by 1/2 steps)           n  words(2-3 x per day)  o hold onto the  n  at the beginning of the word  o 5-1 (E4 to A3)    Speaking pitch should be around A3     n  sentences(2-3 x per day)  o Db4 to E4 to A3 (3-5-1 pattern)    : Denise \"Son\" Keith [phone]/ [email or web address]     Yolanda Jeffers M.M. (voice) MBaldoA., CCC/SLP  Speech-Language Pathologist  Certificate of Vocology  [facility]  [phone]  [email or web address]  Prounouns: she/her              "

## 2018-05-01 NOTE — PROGRESS NOTES
"[facility] VOICE CLINIC  THERAPY NOTE (CPT 88040)    Patient: Anastasia Colbert  Date of Service: 5/1/2018  Referring physician: Dr. Aguiar  Impressions from most recent evaluation (3/26/18):  \"IMPRESSIONS: Anastasia Colbert is a 42 year old female, presenting today with R49.0 (Dysphonia). Dysphonia/discomfort is compounded by the hyperfunction and imbalanced function of the intrinsic and extrinsic laryngeal musculature  . She feels equally bothered by her dysphonia and throat clearing, although the latter was minimally observed today.  Dr. Aguiar recommended that she discontinue her daily use of Claritin to avoid over drying her secretions.\"     SUBJECTIVE:  Since her last session, Ms. Colbert reports the following:     Completing regular practice of:    Morning and evening gargling    Nasal spray    Dry cough today; occasional.    Very tenacious mucus and trying to swallow, or ignore    inconsisent with vocal exercises.    Talking  - noticing tongue base tension    Weird hacking cough - talk and laugh - feels a little spasm like.    Cough - allergy related?     OBJECTIVE:  Ms. Colbert presents today with the following:  VOICE:    Roughness: Mild Intermittent    Breathiness: Mild Consistent    Strain: WNL    Loudness    Conversational speech:  Mild to moderately reduced    Projected speech:  Mildly reduced     COUGH/THROAT CLEARING:    frequent throat clearing; improving    Dry    Locus of cough/ throat clear: sounds consistent with upper airway  PATIENT REPORTED MEASURES:  Patient Supplied Answers To SLP QOL Questionnaire  Therapy Quality of Life 4/25/2018   Since my l ast session, I used the speech therapy exercises and strategies as recommended by my speech pathologist. Agree   I feel that using my therapy techniques has become a habit Neither agree nor disagree   I feel confident in my ability to manage my current and future symptoms. Neither agree nor disagree   Since my last session I feel my symptoms have " "--------. Stayed the same   Overall, since starting therapy I feel my symptoms are --------. About the same     THERAPEUTIC ACTIVITIES    Demonstrated previous exercises.  o demonstrated improved technique  o appropriate redirection provided  o instruction provided for increased level of complexity/difficulty      Semi-Occluded Vocal Tract (SOVT) exercises instructed to reduce laryngeal tension, promote vocal fold pliability, and coordinate respiration and phonation    /z/ or Straw phonation with water resistance was found to be most facilitating     Sustained phonation, and voice vs. voiceless productions used to promote easy voicing and raise awareness of laryngeal tension    Ascending and descending glides utilized to promote vocal fold pliability    \"Messa di voce\", gradual crescendo and decrescendo to vary medial compression was also utilized to promote vocal fold pliability.    Instructed on the benefits of using these exercises for improved coordination of breath flow with phonation and tissue mobilization.    Instructed on the importance of using these exercises as a warm-up / cool down,  and to re-calibrate the voice throughout the day.    Exercises in techniques for improved airflow during phonation    Speech material with /ju/ glides was facilitating at the word level.    Progressed to easy onset/ flow phrases    Waldorf techniques to reduce glottal jimenez and improve breath flow; negative practice improved awareness today.    Modified base of tongue release exercises     1-5-1 vocal warm ups utilized moving up and down in range by 1/2 steps    Resonant Voice Therapy (RVT) exercises to promote forward locus of resonance and optimized pattern of laryngeal adduction    Speech material that elicits a high, forward tongue position (/n/) was most facilitating    Easy descending glide on /n/ utilized in conjunction with relaxed jaw, tongue, and lightly closed lips to facilitate forward resonant sound    Use of the " "carrier phrase \"nnhnn\" instructed to promote generalization to everyday speech    Syllable level using /n/ in alternation with cardinal vowels on sustained pitches and speech inflection    Word level exercises featuring nasal continuant loaded stimuli    Phrase level exercises featuring nasal continuants in more complex phonemic contexts were employed    Instructed with and interval of a descending 5th, as well as an arpeggio pattern was facilitating, prior to progressing to comfortable speech using optimal breath flow.    Negative practice was employed and was facilitative    Able to recognize improvement in quality and comfort    Counseling and Education:    Asked many questions about the nature of her symptoms, and I answered all of these thoroughly.    I provided a summary, an audio recording (iPhone) and handouts of today's therapeutic activities to facilitate practice.    ASSESSMENT/PLAN  PROGRESS TOWARD LONG TERM GOALS:   Modest progress to date, therapeutic methods have been altered to account for this; please see above    IMPRESSIONS: R49.0 (Dysphonia)     PLAN: I will see Ms. Colbert in 2 weeks.   For practice goals see AVS.     TOTAL SERVICE TIME: 60 minutes  TREATMENT (73649): 60 minutes  NO CHARGE FACILITY FEE (96871)    Yolanda Jeffers M.M. (voice), M.A., CCC/SLP  Speech-Language Pathologist  Certificate of Vocology  [facility]  [phone]  [email or web address]  Prounouns: she/her    "

## 2018-05-01 NOTE — MR AVS SNAPSHOT
After Visit Summary   5/1/2018    Anastasia Colbert    MRN: [id number]           Patient Information     Date Of Birth          1975        Visit Information        Provider Department      5/1/2018 2:00 PM Yolanda Jeffers SLP [facility]        Today's Diagnoses     Dysphonia    -  1       Follow-ups after your visit        Your next 10 appointments already scheduled     May 10, 2018  3:00 PM CDT   (Arrive by 2:45 PM)   RETURN SLP VOICE with SUMAN[facility] ([facility])    [address] [place] [address] [phone]            May 24, 2018  3:00 PM CDT   (Arrive by 2:45 PM)   RETURN SLP VOICE with SUMANTH [facility] ([facility])    [address] [place] [address] [phone]              Who to contact     Please call your clinic at [phone] to:    Ask questions about your health    Make or cancel appointments    Discuss your medicines    Learn about your test results    Speak to your doctor            Additional Information About Your Visit        MyChart Information     [employer] gives you secure access to your electronic health record. If you see a primary care provider, you can also send messages to your care team and make appointments. If you have questions, please call your primary care clinic.  If you do not have a primary care provider, please call [phone] and they will assist you.      [employer] is an electronic gateway that provides easy, online access to your medical records. With [employer], you can request a clinic appointment, read your test results, renew a prescription or communicate with your care team.     To access your existing account, please contact your [facility] or call [phone] for assistance.        Care EveryWhere ID     This is your Care EveryWhere ID. This could be used by  other organizations to access your [place] medical records  [id number]         Blood Pressure from Last 3 Encounters:   05/03/18 111/69   03/16/18 116/66   02/16/18 96/64    Weight from Last 3 Encounters:   05/03/18 49.5 kg (109 lb 3.2 oz)   03/16/18 49.9 kg (110 lb)   02/16/18 49.5 kg (109 lb 3.2 oz)              We Performed the Following     SPEECH/HEARING THERAPY, INDIVIDUAL        Primary Care Provider Office Phone # Fax #    Alfa Lundberg [phone]       [address] [address]        Equal Access to Services     ROBERTA RODGERS : Hadii tk ramseyo Sobetsy, watirsoda patyadaha, qaybta kaalmada adelukasyada, manny youssef. So [facility] [phone].    ATENCIÓN: Si habla español, tiene a tamayo disposición servicios gratuitos de asistencia lingüística. Llame al [phone].    We comply with applicable federal civil rights laws and [place] laws. We do not discriminate on the basis of race, color, national origin, age, disability, sex, sexual orientation, or gender identity.            Thank you!     Thank you for choosing [facility]  for your care. Our goal is always to provide you with excellent care. Hearing back from our patients is one way we can continue to improve our services. Please take a few minutes to complete the written survey that you may receive in the mail after your visit with us. Thank you!             Your Updated Medication List - Protect others around you: Learn how to safely use, store and throw away your medicines at www.disposemymeds.org.          This list is accurate as of 5/1/18 11:59 PM.  Always use your most recent med list.                   Brand Name Dispense Instructions for use Diagnosis    CLARITIN PO      Take 1 tablet by mouth.    Shortness of breath, Diarrhea, Nausea with vomiting, Chest pain, unspecified, Abdominal pain, epigastric, Palpitations, Hypotension, Sweaty palms, Tachycardia, Sweats, sweating,  excessive       fluticasone 27.5 MCG/SPRAY spray    VERAMYST     Spray 2 sprays into both nostrils daily    Missed period       levothyroxine 75 MCG tablet    SYNTHROID/LEVOTHROID    90 tablet    Take 1 tablet (75 mcg) by mouth daily    Hypothyroidism, unspecified type       rifaximin 550 MG Tabs tablet    XIFAXAN    42 tablet    Take 1 tablet (550 mg) by mouth 3 times daily    Bacterial overgrowth syndrome       sertraline 25 MG tablet    ZOLOFT    90 tablet    Take 1 tablet (25 mg) by mouth daily    Anxiety

## 2018-05-03 ENCOUNTER — OFFICE VISIT (OUTPATIENT)
Dept: INTERNAL MEDICINE | Facility: CLINIC | Age: 43
End: 2018-05-03
Payer: COMMERCIAL

## 2018-05-03 VITALS
SYSTOLIC BLOOD PRESSURE: 111 MMHG | BODY MASS INDEX: 19.34 KG/M2 | OXYGEN SATURATION: 98 % | HEART RATE: 73 BPM | DIASTOLIC BLOOD PRESSURE: 69 MMHG | TEMPERATURE: 97.7 F | RESPIRATION RATE: 12 BRPM | WEIGHT: 109.2 LBS

## 2018-05-03 DIAGNOSIS — R21 RASH: ICD-10-CM

## 2018-05-03 DIAGNOSIS — R05.9 COUGH: ICD-10-CM

## 2018-05-03 DIAGNOSIS — R21 RASH AND NONSPECIFIC SKIN ERUPTION: ICD-10-CM

## 2018-05-03 DIAGNOSIS — R09.82 POST-NASAL DRIP: Primary | ICD-10-CM

## 2018-05-03 RX ORDER — BENZONATATE 200 MG/1
200 CAPSULE ORAL 3 TIMES DAILY PRN
Qty: 42 CAPSULE | Refills: 3 | Status: SHIPPED | OUTPATIENT
Start: 2018-05-03 | End: 2019-07-23

## 2018-05-03 ASSESSMENT — PAIN SCALES - GENERAL: PAINLEVEL: NO PAIN (0)

## 2018-05-03 NOTE — MR AVS SNAPSHOT
After Visit Summary   5/3/2018    Anastasia Colbert    MRN: [id number]           Patient Information     Date Of Birth          1975        Visit Information        Provider Department      5/3/2018 7:30 AM Letty Florence MD [facility]        Today's Diagnoses     Post-nasal drip    -  1    Rash        Cough          Care Instructions    Primary Care Center: [phone]     Primary Care Center Medication Refill Request Information:  * Please contact your pharmacy regarding ANY request for medication refills.  ** [facility] Prescription Fax = [phone]  * Please allow 3 business days for routine medication refills.  * Please allow 5 business days for controlled substance medication refills.     Primary Care Center Test Result notification information:  *You will be notified with in 7-10 days of your appointment day regarding the results of your test.  If you are on MyChart you will be notified as soon as the provider has reviewed the results and signed off on them.            Follow-ups after your visit        Your next 10 appointments already scheduled     May 10, 2018  3:00 PM CDT   (Arrive by 2:45 PM)   RETURN SLP VOICE with SUMANTH [facility]    [facility] ([facility])    [address] [place] [address] [phone]            May 24, 2018  3:00 PM CDT   (Arrive by 2:45 PM)   RETURN SLP VOICE with SUMANTH [facility]    [facility] ([facility])    [address] [place] [address] [phone]              Who to contact     Please call your clinic at [phone] to:    Ask questions about your health    Make or cancel appointments    Discuss your medicines    Learn about your test results    Speak to your doctor            Additional Information About Your Visit        MyChart Information     [employer] gives you secure access to your electronic health record.  If you see a primary care provider, you can also send messages to your care team and make appointments. If you have questions, please call your primary care clinic.  If you do not have a primary care provider, please call [phone] and they will assist you.      [employer] is an electronic gateway that provides easy, online access to your medical records. With [employer], you can request a clinic appointment, read your test results, renew a prescription or communicate with your care team.     To access your existing account, please contact your [facility] or call [phone] for assistance.        Care EveryWhere ID     This is your Care EveryWhere ID. This could be used by other organizations to access your [place] medical records  [id number]        Your Vitals Were     Pulse Temperature Respirations Pulse Oximetry BMI (Body Mass Index)       73 97.7  F (36.5  C) 12 98% 19.34 kg/m2        Blood Pressure from Last 3 Encounters:   05/03/18 111/69   03/16/18 116/66   02/16/18 96/64    Weight from Last 3 Encounters:   05/03/18 49.5 kg (109 lb 3.2 oz)   03/16/18 49.9 kg (110 lb)   02/16/18 49.5 kg (109 lb 3.2 oz)              Today, you had the following     No orders found for display         Today's Medication Changes          These changes are accurate as of 5/3/18  8:04 AM.  If you have any questions, ask your nurse or doctor.               Start taking these medicines.        Dose/Directions    benzonatate 200 MG capsule   Commonly known as:  TESSALON   Used for:  Cough   Started by:  Letty Florence MD        Dose:  200 mg   Take 1 capsule (200 mg) by mouth 3 times daily as needed for cough   Quantity:  42 capsule   Refills:  3            Where to get your medicines      These medications were sent to [id number] - [place] - [address]  [address] [address]    Hours:  test fax sent successfully 7/31/03 kr Phone:   [phone]     benzonatate 200 MG capsule                Primary Care Provider Office Phone # Fax #    Alfa Lundberg [phone]       [address] [address]        Equal Access to Services     ASHLEEBECKY ANA MARIA : Hadii aad ku hadernestoo Sorogerioali, waaxda luqadaha, qaybta kaalmada adeegyada, waxkristian idiin haypeymann adelukas morrison bobbi youssef. So [facility] [phone].    ATENCIÓN: Si habla español, tiene a tamayo disposición servicios gratuitos de asistencia lingüística. Llame al [phone].    We comply with applicable federal civil rights laws and [place] laws. We do not discriminate on the basis of race, color, national origin, age, disability, sex, sexual orientation, or gender identity.            Thank you!     Thank you for choosing [facility]  for your care. Our goal is always to provide you with excellent care. Hearing back from our patients is one way we can continue to improve our services. Please take a few minutes to complete the written survey that you may receive in the mail after your visit with us. Thank you!             Your Updated Medication List - Protect others around you: Learn how to safely use, store and throw away your medicines at www.disposemymeds.org.          This list is accurate as of 5/3/18  8:04 AM.  Always use your most recent med list.                   Brand Name Dispense Instructions for use Diagnosis    benzonatate 200 MG capsule    TESSALON    42 capsule    Take 1 capsule (200 mg) by mouth 3 times daily as needed for cough    Cough       CLARITIN PO      Take 1 tablet by mouth.    Shortness of breath, Diarrhea, Nausea with vomiting, Chest pain, unspecified, Abdominal pain, epigastric, Palpitations, Hypotension, Sweaty palms, Tachycardia, Sweats, sweating, excessive       fluticasone 27.5 MCG/SPRAY spray    VERAMYST     Spray 2 sprays into both nostrils daily    Missed period       levothyroxine 75 MCG tablet    SYNTHROID/LEVOTHROID     90 tablet    Take 1 tablet (75 mcg) by mouth daily    Hypothyroidism, unspecified type       rifaximin 550 MG Tabs tablet    XIFAXAN    42 tablet    Take 1 tablet (550 mg) by mouth 3 times daily    Bacterial overgrowth syndrome       sertraline 25 MG tablet    ZOLOFT    90 tablet    Take 1 tablet (25 mg) by mouth daily    Anxiety

## 2018-05-03 NOTE — PATIENT INSTRUCTIONS
[facility]: [phone]     [facility] Medication Refill Request Information:  * Please contact your pharmacy regarding ANY request for medication refills.  ** [facility] Prescription Fax = [phone]  * Please allow 3 business days for routine medication refills.  * Please allow 5 business days for controlled substance medication refills.     [facility] Test Result notification information:  *You will be notified with in 7-10 days of your appointment day regarding the results of your test.  If you are on MyChart you will be notified as soon as the provider has reviewed the results and signed off on them.

## 2018-05-03 NOTE — NURSING NOTE
"Chief Complaint   Patient presents with     Cough     patient states lingering cough     Derm Problem     patient states having a \"rash\"      ORIN NARVAEZ at 7:27 AM on 5/3/2018.    "

## 2018-05-10 ENCOUNTER — OFFICE VISIT (OUTPATIENT)
Dept: OTOLARYNGOLOGY | Facility: CLINIC | Age: 43
End: 2018-05-10
Payer: COMMERCIAL

## 2018-05-10 DIAGNOSIS — R49.0 DYSPHONIA: Primary | ICD-10-CM

## 2018-05-10 NOTE — LETTER
"5/10/2018       RE: Anastasia Colbert  [address]  [place] [address]     Dear Colleague,    Thank you for referring your patient, Anastasia Colbert, to the [facility]. Please see a copy of my visit note below.    [facility]  THERAPY NOTE (CPT 75706)    Patient: Anastasia Colbert  Date of Service: 5/10/2018  Referring physician: Dr. Aguiar  Impressions from most recent evaluation (3/26/18):  \"IMPRESSIONS: Anastasia Colbert is a 42 year old female, presenting today with R49.0 (Dysphonia). Dysphonia/discomfort is compounded by the hyperfunction and imbalanced function of the intrinsic and extrinsic laryngeal musculature  . She feels equally bothered by her dysphonia and throat clearing, although the latter was minimally observed today.  Dr. Aguiar recommended that she discontinue her daily use of Claritin to avoid over drying her secretions.\"    SUBJECTIVE:  Since her last session, Ms. Colbert reports the following:     Overall she reports that symptoms are improved    Successes: more consistent during her therapy sessions    OBJECTIVE:  Ms. Colbert presents today with the following:  Voice quality:    Essentially within normal limits    Occasional glottal jimenez    Intermittent back focus; much improved  Cough/ Throat clear:    Not observed    PATIENT REPORTED MEASURES:  Patient Supplied Answers To SLP QOL Questionnaire  Therapy Quality of Life 5/7/2018   Since my l ast session, I used the speech therapy exercises and strategies as recommended by my speech pathologist. Agree   I feel that using my therapy techniques has become a habit Neither agree nor disagree   I feel confident in my ability to manage my current and future symptoms. Agree   Since my last session I feel my symptoms have --------. Improved   Overall, since starting therapy I feel my symptoms are --------. Better     THERAPEUTIC ACTIVITIES    Asked many questions " "about the nature of her symptoms, and I answered all of these thoroughly.    Demonstrated previous exercises.  o demonstrated improved technique  o appropriate redirection provided  o instruction provided for increased level of complexity/difficulty    Resonant Voice Therapy (RVT) exercises to promote forward locus of resonance and optimized pattern of laryngeal adduction    Speech material that elicits a high, forward tongue position (/n/) was most facilitating    Easy descending glide on /n/ utilized in conjunction with relaxed jaw, tongue, and lightly closed lips to facilitate forward resonant sound    Use of the carrier phrase \"nnhnn\" instructed to promote generalization to everyday speech    Syllable level using /n/ in alternation with cardinal vowels on sustained pitches and speech inflection    Word level exercises featuring nasal continuant loaded stimuli    Phrase level exercises featuring nasal continuants in more complex phonemic contexts were employed    Instructed with and interval of a descending 5th, as well as an arpeggio pattern was facilitating, prior to progressing to comfortable speech using optimal breath flow.    Negative practice was employed and was facilitative    Able to recognize improvement in quality and comfort    Counseling and Education:    Asked many questions about the nature of her symptoms, and I answered all of these thoroughly.    I provided a summary, an audio recording (iPhone) and handouts of today's therapeutic activities to facilitate practice.     ASSESSMENT/PLAN  PROGRESS TOWARD LONG TERM GOALS:   Adequate but incomplete progress; please see above    IMPRESSIONS: R49.0 (Dysphonia)     PLAN: I will see Ms. Colbert in 4 weeks, at which point we will focus on the transition between speaking and singing voices, and reassess progress. Moved next appt to 6/14 at 3pm   For practice goals see AVS.     TOTAL SERVICE TIME: 60 minutes  TREATMENT (37657): 60 minutes  NO CHARGE " FACILITY FEE (30746)    Yolanda Jeffers M.M. (voice), M.A., CCC/SLP  Speech-Language Pathologist  Certificate of Vocology  [facility]  [phone]  [email or web address]  Prounouns: she/her

## 2018-05-10 NOTE — MR AVS SNAPSHOT
After Visit Summary   5/10/2018    Anastasia Colbert    MRN: [id number]           Patient Information     Date Of Birth          1975        Visit Information        Provider Department      5/10/2018 3:00 PM Yolanda Jeffers [facility]        Today's Diagnoses     Dysphonia    -  1       Follow-ups after your visit        Your next 10 appointments already scheduled     Jun 14, 2018  3:00 PM CDT   (Arrive by 2:45 PM)   RETURN SLP VOICE with [facility]AN[facility] ([facility])    [address] [place] [address] [phone]              Who to contact     Please call your clinic at [phone] to:    Ask questions about your health    Make or cancel appointments    Discuss your medicines    Learn about your test results    Speak to your doctor            Additional Information About Your Visit        MyChart Information     [employer] gives you secure access to your electronic health record. If you see a primary care provider, you can also send messages to your care team and make appointments. If you have questions, please call your primary care clinic.  If you do not have a primary care provider, please call [phone] and they will assist you.      [employer] is an electronic gateway that provides easy, online access to your medical records. With [employer], you can request a clinic appointment, read your test results, renew a prescription or communicate with your care team.     To access your existing account, please contact your [facility] or call [phone] for assistance.        Care EveryWhere ID     This is your Care EveryWhere ID. This could be used by other organizations to access your [place] medical records  [id number]         Blood Pressure from Last 3 Encounters:   05/03/18 111/69   03/16/18 116/66   02/16/18 96/64    Weight from Last 3 Encounters:   05/03/18 49.5 kg (109 lb  3.2 oz)   03/16/18 49.9 kg (110 lb)   02/16/18 49.5 kg (109 lb 3.2 oz)              We Performed the Following     SPEECH/HEARING THERAPY, INDIVIDUAL        Primary Care Provider Office Phone # Fax #    Alfa Lundberg [phone] [address] [facility] [address]  [facility] [address]        Equal Access to Services     BECKY RODGERS : Hadii aad ku hadasho Soomaali, waaxda luqadaha, qaybta kaalmada adeegyada, waxay idiin hayaan adeeg khgonzalosh la'aan ah. So [facility] [phone].    ATENCIÓN: Si habla espryan, tiene a tamayo disposición servicios gratuitos de asistencia lingüística. Thangame al [phone].    We comply with applicable federal civil rights laws and [place] laws. We do not discriminate on the basis of race, color, national origin, age, disability, sex, sexual orientation, or gender identity.            Thank you!     Thank you for choosing  [facility]  for your care. Our goal is always to provide you with excellent care. Hearing back from our patients is one way we can continue to improve our services. Please take a few minutes to complete the written survey that you may receive in the mail after your visit with us. Thank you!             Your Updated Medication List - Protect others around you: Learn how to safely use, store and throw away your medicines at www.disposemymeds.org.          This list is accurate as of 5/10/18 11:59 PM.  Always use your most recent med list.                   Brand Name Dispense Instructions for use Diagnosis    benzonatate 200 MG capsule    TESSALON    42 capsule    Take 1 capsule (200 mg) by mouth 3 times daily as needed for cough    Cough       CLARITIN PO      Take 1 tablet by mouth.    Shortness of breath, Diarrhea, Nausea with vomiting, Chest pain, unspecified, Abdominal pain, epigastric, Palpitations, Hypotension, Sweaty palms, Tachycardia, Sweats, sweating, excessive       fluticasone 27.5 MCG/SPRAY spray    VERAMYST     Spray 2 sprays into both nostrils  daily    Missed period       levothyroxine 75 MCG tablet    SYNTHROID/LEVOTHROID    90 tablet    Take 1 tablet (75 mcg) by mouth daily    Hypothyroidism, unspecified type       rifaximin 550 MG Tabs tablet    XIFAXAN    42 tablet    Take 1 tablet (550 mg) by mouth 3 times daily    Bacterial overgrowth syndrome       sertraline 25 MG tablet    ZOLOFT    90 tablet    Take 1 tablet (25 mg) by mouth daily    Anxiety

## 2018-05-10 NOTE — PROGRESS NOTES
"[facility] VOICE CLINIC  THERAPY NOTE (CPT 24007)    Patient: Anastasia Colbert  Date of Service: 5/10/2018  Referring physician: Dr. Aguiar  Impressions from most recent evaluation (3/26/18):  \"IMPRESSIONS: Anastasia Colbert is a 42 year old female, presenting today with R49.0 (Dysphonia). Dysphonia/discomfort is compounded by the hyperfunction and imbalanced function of the intrinsic and extrinsic laryngeal musculature  . She feels equally bothered by her dysphonia and throat clearing, although the latter was minimally observed today.  Dr. Aguiar recommended that she discontinue her daily use of Claritin to avoid over drying her secretions.\"    SUBJECTIVE:  Since her last session, Ms. Colbert reports the following:     Overall she reports that symptoms are improved    Successes: more consistent during her therapy sessions    OBJECTIVE:  Ms. Colbert presents today with the following:  Voice quality:    Essentially within normal limits    Occasional glottal jimenez    Intermittent back focus; much improved  Cough/ Throat clear:    Not observed    PATIENT REPORTED MEASURES:  Patient Supplied Answers To SLP QOL Questionnaire  Therapy Quality of Life 5/7/2018   Since my l ast session, I used the speech therapy exercises and strategies as recommended by my speech pathologist. Agree   I feel that using my therapy techniques has become a habit Neither agree nor disagree   I feel confident in my ability to manage my current and future symptoms. Agree   Since my last session I feel my symptoms have --------. Improved   Overall, since starting therapy I feel my symptoms are --------. Better     THERAPEUTIC ACTIVITIES    Asked many questions about the nature of her symptoms, and I answered all of these thoroughly.    Demonstrated previous exercises.  o demonstrated improved technique  o appropriate redirection provided  o instruction provided for increased level of complexity/difficulty    Resonant Voice Therapy (RVT) exercises to " "promote forward locus of resonance and optimized pattern of laryngeal adduction    Speech material that elicits a high, forward tongue position (/n/) was most facilitating    Easy descending glide on /n/ utilized in conjunction with relaxed jaw, tongue, and lightly closed lips to facilitate forward resonant sound    Use of the carrier phrase \"nnhnn\" instructed to promote generalization to everyday speech    Syllable level using /n/ in alternation with cardinal vowels on sustained pitches and speech inflection    Word level exercises featuring nasal continuant loaded stimuli    Phrase level exercises featuring nasal continuants in more complex phonemic contexts were employed    Instructed with and interval of a descending 5th, as well as an arpeggio pattern was facilitating, prior to progressing to comfortable speech using optimal breath flow.    Negative practice was employed and was facilitative    Able to recognize improvement in quality and comfort    Counseling and Education:    Asked many questions about the nature of her symptoms, and I answered all of these thoroughly.    I provided a summary, an audio recording (iPhone) and handouts of today's therapeutic activities to facilitate practice.     ASSESSMENT/PLAN  PROGRESS TOWARD LONG TERM GOALS:   Adequate but incomplete progress; please see above    IMPRESSIONS: R49.0 (Dysphonia)     PLAN: I will see Ms. Colbert in 4 weeks, at which point we will focus on the transition between speaking and singing voices, and reassess progress. Moved next appt to 6/14 at 3pm   For practice goals see AVS.     TOTAL SERVICE TIME: 60 minutes  TREATMENT (94913): 60 minutes  NO CHARGE FACILITY FEE (02453)    Yolanda Jeffers M.M. (voice), M.A., CCC/SLP  Speech-Language Pathologist  Certificate of Vocology  [facility]  [phone]  [email or web address]  Prounouns: she/her      "

## 2018-06-21 ENCOUNTER — OFFICE VISIT (OUTPATIENT)
Dept: OBGYN | Facility: CLINIC | Age: 43
End: 2018-06-21
Attending: NURSE PRACTITIONER
Payer: COMMERCIAL

## 2018-06-21 VITALS
BODY MASS INDEX: 19.47 KG/M2 | HEART RATE: 92 BPM | DIASTOLIC BLOOD PRESSURE: 71 MMHG | SYSTOLIC BLOOD PRESSURE: 119 MMHG | HEIGHT: 63 IN | WEIGHT: 109.9 LBS

## 2018-06-21 DIAGNOSIS — N89.8 VAGINAL DISCHARGE: ICD-10-CM

## 2018-06-21 DIAGNOSIS — B96.89 BACTERIAL VAGINOSIS: Primary | ICD-10-CM

## 2018-06-21 DIAGNOSIS — N76.0 BACTERIAL VAGINOSIS: Primary | ICD-10-CM

## 2018-06-21 DIAGNOSIS — Z11.3 SCREEN FOR STD (SEXUALLY TRANSMITTED DISEASE): ICD-10-CM

## 2018-06-21 LAB
CLUE CELLS: NORMAL
TRICHOMONAS (WET PREP): NORMAL
YEAST (WET PREP): NORMAL

## 2018-06-21 PROCEDURE — 87591 N.GONORRHOEAE DNA AMP PROB: CPT | Performed by: NURSE PRACTITIONER

## 2018-06-21 PROCEDURE — 87491 CHLMYD TRACH DNA AMP PROBE: CPT | Performed by: NURSE PRACTITIONER

## 2018-06-21 PROCEDURE — 87210 SMEAR WET MOUNT SALINE/INK: CPT | Mod: ZF | Performed by: NURSE PRACTITIONER

## 2018-06-21 RX ORDER — METRONIDAZOLE 7.5 MG/G
1 GEL VAGINAL AT BEDTIME
Qty: 70 G | Refills: 0 | Status: SHIPPED | OUTPATIENT
Start: 2018-06-21 | End: 2018-06-26

## 2018-06-21 NOTE — LETTER
"2018       RE: Anastasia Colbert  [address]  [place] [address]     Dear Colleague,    Thank you for referring your patient, Anastasia Colbert, to the [facility] at [facility]. Please see a copy of my visit note below.    SUBJECTIVE:   Anastasia Colbert is a 43 yr old female, , who presents to clinic today for evaluation of vaginal discharge.    Reports increase in amount of discharge and malodor. Denies itching, urinary symptoms, AUB, or pelvic pain. Denies use of new products. No known exposures to STDs.      Contraception: partner had a vasectomy  Menses: regular intervals    Patient's last menstrual period was 2018 (exact date).    OBJECTIVE:  /71  Pulse 92  Ht 1.6 m (5' 3\")  Wt 49.9 kg (109 lb 14.4 oz)  LMP 2018 (Exact Date)  BMI 19.47 kg/m2  General: pleasant female in no acute distress  Abdomen: soft, non-tender  Pelvic Exam:  Vulva: No external lesions, normal hair distribution, no adenopathy  Vagina: Moist, pink, watery, white, malodorous discharge, well rugated, no lesions  Cervix: smooth, pink, no visible lesions; without CMT  Uterus: Normal size, anteverted, non-tender, mobile  Ovaries: No mass, non-tender, mobile    Wet prep: positive for clue cells; negative for trichomonas and yeast; elevated pH    ASSESSMENT:  Bacterial vaginosis  (primary encounter diagnosis)  Vaginal discharge  Screen for STD (sexually transmitted disease)    PLAN:   Treat BV with metrogel x 5 nights. Patient states she has tolerated this medication well in the past, despite allergy for PO Metronidazole (reaction: anxiety, nausea and vomiting).   Screen for gonorrhea & chlamydia.      Patient expressed understanding and agreement with the plan for care.    Isi Bartlett, KELLY, APRN, WHNP              "

## 2018-06-21 NOTE — MR AVS SNAPSHOT
"              After Visit Summary   6/21/2018    Anastasia Colbert    MRN: [id number]           Patient Information     Date Of Birth          1975        Visit Information        Provider Department      6/21/2018 2:30 PM Isi Bartlett APRN [facility]        Today's Diagnoses     Bacterial vaginosis    -  1    Vaginal discharge        Screen for STD (sexually transmitted disease)           Follow-ups after your visit        Who to contact     Please call your clinic at [phone] to:    Ask questions about your health    Make or cancel appointments    Discuss your medicines    Learn about your test results    Speak to your doctor            Additional Information About Your Visit        G[employer] Information     [employer] gives you secure access to your electronic health record. If you see a primary care provider, you can also send messages to your care team and make appointments. If you have questions, please call your primary care clinic.  If you do not have a primary care provider, please call [phone] and they will assist you.      [employer] is an electronic gateway that provides easy, online access to your medical records. With [employer] you can request a clinic appointment, read your test results, renew a prescription or communicate with your care team.     To access your existing account, please contact your [facility] or call [phone] for assistance.        Care EveryWhere ID     This is your Care EveryWhere ID. This could be used by other organizations to access your [place] medical records  [id number]        Your Vitals Were     Pulse Height Last Period BMI (Body Mass Index)          92 1.6 m (5' 3\") 06/05/2018 (Exact Date) 19.47 kg/m2         Blood Pressure from Last 3 Encounters:   06/21/18 119/71   05/03/18 111/69   03/16/18 116/66    Weight from Last 3 Encounters:   06/21/18 49.9 kg (109 lb 14.4 oz)   05/03/18 49.5 kg " (109 lb 3.2 oz)   03/16/18 49.9 kg (110 lb)              We Performed the Following     Chlamydia PCR     Gonorrhea PCR     Wet Prep POCT          Today's Medication Changes          These changes are accurate as of 6/21/18 11:59 PM.  If you have any questions, ask your nurse or doctor.               Start taking these medicines.        Dose/Directions    metroNIDAZOLE 0.75 % vaginal gel   Commonly known as:  METROGEL   Used for:  Bacterial vaginosis   Started by:  Isi Bartlett APRN CNP        Dose:  1 applicator   Place 1 applicator (5 g) vaginally At Bedtime for 5 days   Quantity:  70 g   Refills:  0            Where to get your medicines      These medications were sent to [facility] [place], [place] - [address]  [address] [address]     Phone:  [phone]     metroNIDAZOLE 0.75 % vaginal gel                Primary Care Provider Office Phone # Fax #    Alfa Lundberg [phone]       [address] [address]        Equal Access to Services     [facility]: Hadii aad ku hadasho Soomaali, waaxda luqadaha, qaybta kaalmada adeegyada, manny martines . So [facility] [phone].    ATENCIÓN: Si habla español, tiene a tamayo disposición servicios gratuitos de asistencia lingüística. Llame al [phone].    We comply with applicable federal civil rights laws and [place] laws. We do not discriminate on the basis of race, color, national origin, age, disability, sex, sexual orientation, or gender identity.            Thank you!     Thank you for choosing [facility]  for your care. Our goal is always to provide you with excellent care. Hearing back from our patients is one way we can continue to improve our services. Please take a few minutes to complete the written survey that you may receive in the mail after your visit with us. Thank you!             Your Updated Medication  List - Protect others around you: Learn how to safely use, store and throw away your medicines at www.disposemymeds.org.          This list is accurate as of 6/21/18 11:59 PM.  Always use your most recent med list.                   Brand Name Dispense Instructions for use Diagnosis    benzonatate 200 MG capsule    TESSALON    42 capsule    Take 1 capsule (200 mg) by mouth 3 times daily as needed for cough    Cough       CLARITIN PO      Take 1 tablet by mouth.    Shortness of breath, Diarrhea, Nausea with vomiting, Chest pain, unspecified, Abdominal pain, epigastric, Palpitations, Hypotension, Sweaty palms, Tachycardia, Sweats, sweating, excessive       fluticasone 27.5 MCG/SPRAY spray    VERAMYST     Spray 2 sprays into both nostrils daily    Missed period       levothyroxine 75 MCG tablet    SYNTHROID/LEVOTHROID    90 tablet    Take 1 tablet (75 mcg) by mouth daily    Hypothyroidism, unspecified type       metroNIDAZOLE 0.75 % vaginal gel    METROGEL    70 g    Place 1 applicator (5 g) vaginally At Bedtime for 5 days    Bacterial vaginosis       rifaximin 550 MG Tabs tablet    XIFAXAN    42 tablet    Take 1 tablet (550 mg) by mouth 3 times daily    Bacterial overgrowth syndrome       sertraline 25 MG tablet    ZOLOFT    90 tablet    Take 1 tablet (25 mg) by mouth daily    Anxiety

## 2018-07-02 NOTE — PROGRESS NOTES
"SUBJECTIVE:   Anastasia Colbert is a 43 yr old female, , who presents to clinic today for evaluation of vaginal discharge.    Reports increase in amount of discharge and malodor. Denies itching, urinary symptoms, AUB, or pelvic pain. Denies use of new products. No known exposures to STDs.      Contraception: partner had a vasectomy  Menses: regular intervals    Patient's last menstrual period was 2018 (exact date).    OBJECTIVE:  /71  Pulse 92  Ht 1.6 m (5' 3\")  Wt 49.9 kg (109 lb 14.4 oz)  LMP 2018 (Exact Date)  BMI 19.47 kg/m2  General: pleasant female in no acute distress  Abdomen: soft, non-tender  Pelvic Exam:  Vulva: No external lesions, normal hair distribution, no adenopathy  Vagina: Moist, pink, watery, white, malodorous discharge, well rugated, no lesions  Cervix: smooth, pink, no visible lesions; without CMT  Uterus: Normal size, anteverted, non-tender, mobile  Ovaries: No mass, non-tender, mobile    Wet prep: positive for clue cells; negative for trichomonas and yeast; elevated pH    ASSESSMENT:  Bacterial vaginosis  (primary encounter diagnosis)  Vaginal discharge  Screen for STD (sexually transmitted disease)    PLAN:   Treat BV with metrogel x 5 nights. Patient states she has tolerated this medication well in the past, despite allergy for PO Metronidazole (reaction: anxiety, nausea and vomiting).   Screen for gonorrhea & chlamydia.      Patient expressed understanding and agreement with the plan for care.    Isi Bartlett, KELLY, APRN, WHNP            "

## 2018-07-31 ENCOUNTER — OFFICE VISIT (OUTPATIENT)
Dept: OBGYN | Facility: CLINIC | Age: 43
End: 2018-07-31
Attending: NURSE PRACTITIONER
Payer: COMMERCIAL

## 2018-07-31 VITALS
HEIGHT: 63 IN | DIASTOLIC BLOOD PRESSURE: 71 MMHG | HEART RATE: 92 BPM | WEIGHT: 110.1 LBS | BODY MASS INDEX: 19.51 KG/M2 | SYSTOLIC BLOOD PRESSURE: 112 MMHG

## 2018-07-31 DIAGNOSIS — F43.23 ADJUSTMENT DISORDER WITH MIXED ANXIETY AND DEPRESSED MOOD: Primary | ICD-10-CM

## 2018-07-31 DIAGNOSIS — F32.81 PMDD (PREMENSTRUAL DYSPHORIC DISORDER): ICD-10-CM

## 2018-07-31 PROCEDURE — G0463 HOSPITAL OUTPT CLINIC VISIT: HCPCS | Mod: ZF

## 2018-07-31 NOTE — MR AVS SNAPSHOT
"              After Visit Summary   7/31/2018    Anastasia Colbert    MRN: [id number]           Patient Information     Date Of Birth          1975        Visit Information        Provider Department      7/31/2018 8:00 AM Isi Bartlett APRN [facility]        Today's Diagnoses     Adjustment disorder with mixed anxiety and depressed mood    -  1    PMDD (premenstrual dysphoric disorder)           Follow-ups after your visit        Who to contact     Please call your clinic at [phone] to:    Ask questions about your health    Make or cancel appointments    Discuss your medicines    Learn about your test results    Speak to your doctor            Additional Information About Your Visit        [employer] Information     [employer] gives you secure access to your electronic health record. If you see a primary care provider, you can also send messages to your care team and make appointments. If you have questions, please call your primary care clinic.  If you do not have a primary care provider, please call [phone] and they will assist you.      [employer] is an electronic gateway that provides easy, online access to your medical records. With [employer], you can request a clinic appointment, read your test results, renew a prescription or communicate with your care team.     To access your existing account, please contact your [facility] or call [phone] for assistance.        Care EveryWhere ID     This is your Care EveryWhere ID. This could be used by other organizations to access your [place] medical records  [id number]        Your Vitals Were     Pulse Height Last Period BMI (Body Mass Index)          92 1.6 m (5' 2.99\") 07/18/2018 19.51 kg/m2         Blood Pressure from Last 3 Encounters:   07/31/18 112/71   06/21/18 119/71   05/03/18 111/69    Weight from Last 3 Encounters:   07/31/18 49.9 kg (110 lb 1.6 oz)   06/21/18 49.9 kg (109 " lb 14.4 oz)   05/03/18 49.5 kg (109 lb 3.2 oz)              Today, you had the following     No orders found for display       Primary Care Provider Office Phone # Fax #    Alfa Lundberg [phone]       [address] [address]        Equal Access to Services     [facility] : Hadii aad ku hadasho Soomaali, waaxda luqadaha, qaybta kaalmada adeegyada, waxay idiin hayaan adeeg khgonzalosh laMicaan . So [facility] [phone].    ATENCIÓN: Si habla español, tiene a tamayo disposición servicios gratuitos de asistencia lingüística. Llame al [phone].    We comply with applicable federal civil rights laws and [place] laws. We do not discriminate on the basis of race, color, national origin, age, disability, sex, sexual orientation, or gender identity.            Thank you!     Thank you for choosing [facility]  for your care. Our goal is always to provide you with excellent care. Hearing back from our patients is one way we can continue to improve our services. Please take a few minutes to complete the written survey that you may receive in the mail after your visit with us. Thank you!             Your Updated Medication List - Protect others around you: Learn how to safely use, store and throw away your medicines at www.disposemymeds.org.          This list is accurate as of 7/31/18 11:59 PM.  Always use your most recent med list.                   Brand Name Dispense Instructions for use Diagnosis    benzonatate 200 MG capsule    TESSALON    42 capsule    Take 1 capsule (200 mg) by mouth 3 times daily as needed for cough    Cough       CLARITIN PO      Take 1 tablet by mouth.    Shortness of breath, Diarrhea, Nausea with vomiting, Chest pain, unspecified, Abdominal pain, epigastric, Palpitations, Hypotension, Sweaty palms, Tachycardia, Sweats, sweating, excessive       fluticasone 27.5 MCG/SPRAY spray    VERAMYST     Spray 2 sprays into both nostrils daily     Missed period       levothyroxine 75 MCG tablet    SYNTHROID/LEVOTHROID    90 tablet    Take 1 tablet (75 mcg) by mouth daily    Hypothyroidism, unspecified type       rifaximin 550 MG Tabs tablet    XIFAXAN    42 tablet    Take 1 tablet (550 mg) by mouth 3 times daily    Bacterial overgrowth syndrome

## 2018-07-31 NOTE — PROGRESS NOTES
"SUBJECTIVE:   Anastasia Colbert is a 43 yr old female, , who presents to clinic today to discuss PMDD.    Anastasia started tracking her mood this year; in the luteal phase of her menstrual cycle, has a couple days that she feels \"panicy\" and \"anxious\" followed by 1-2 days with \"low mood.\"  She does have a hx of anxiety and depression, but these symptoms worsen during the luteal phase.  During this time, she has trouble concentrating and focusing. Occasionally she has had thoughts of \"not wanting to do this anymore\" and \"just wanting to die.\"  Two cycles ago, Anastasia started to think about methodology and ways she \"could\" die. She felt these thoughts were much more intrusive than they had been in the past, which prompted her to schedule this appointment. She has never had a plan for suicide. Anastasia states she sleeps very well - no trouble falling or staying asleep. Does report increase in appetite and breast tenderness during the days leading up to her menses, denies headaches or bloating. All of her symptoms resolve 1-2 days after her menses begin and she remains feeling well during the follicular days of her cycle. Anastasia is seeing a therapist once per week.  Her daughter committed suicide a couple months ago.   Anastasia is also attending a survivors group on  to help her grieve with the loss of her child.  Anastasia has not taken any medications for anxiety or depression since her teenage years. At that time, she took Prozac, which helped, but she expienced the side effect of drowsiness.        Anastasia states she feels safe. Denies suicidal thoughts today.     Menses: q21-28 days, bleeding is light to moderate and lasts for 6 days    Current contraception: partner had a vasectomy  - Anastasia has been on birth control pills in the past, last in her 20's and felt that they made her mood worsen.     OBJECTIVE:  /71  Pulse 92  Ht 1.6 m (5' 2.99\")  Wt 49.9 kg (110 lb 1.6 oz)  LMP 2018  BMI 19.51 kg/m2  General: " pleasant female in no acute distress, well-groomed   Psych: normal mentation  Musculoskeletal: no gross deformities     ASSESSMENT:   Adjustment disorder with mixed anxiety and depressed mood  (primary encounter diagnosis)  PMDD (premenstrual dysphoric disorder)    PLAN:   Discussed options for management of PMDD symptoms, including a hormonal method to prevent ovulation or antidepressant and antianxiety medication to more specifically control her mental health symptoms. Anastasia expressed interest in initiating a serotonin specific reuptake inhibitor, but expressed concern that Prozac made her drowsy when she took it many yrs ago and she does not desire to feel that side effect, again. Consulted with Dr. Amezquita, Pharm D, who recommended Celexa or Fluoxetine, although Celexa may be better for anxiety or panic like symptoms. Anastasia desires to consider these options and notify provider of preference by Berna.  Explained to [place]asia that [place] [facility] [place] [facility]; she should present there, or any other ER, if she has suicidal thoughts.    She left, stable; she expressed understanding and agreement with the plan for care.    A total of 25 minutes was spent in direct contact with the patient and > 50% of the time in patient education and coordination of care.    Isi Bartlett, DNP, APRN, WHNP

## 2018-07-31 NOTE — LETTER
"2018       RE: Anastasia Colbert  [address]  [place] [address]     Dear Colleague,    Thank you for referring your patient, Anastasia Colbert, to the [facility] at [facility]. Please see a copy of my visit note below.    SUBJECTIVE:   Anastasia Colbert is a 43 yr old female, , who presents to clinic today to discuss PMDD.    Anastasia started tracking her mood this year; in the luteal phase of her menstrual cycle, has a couple days that she feels \"panicy\" and \"anxious\" followed by 1-2 days with \"low mood.\"  She does have a hx of anxiety and depression, but these symptoms worsen during the luteal phase.  During this time, she has trouble concentrating and focusing. Occasionally she has had thoughts of \"not wanting to do this anymore\" and \"just wanting to die.\"  Two cycles ago, Anastasia started to think about methodology and ways she \"could\" die. She felt these thoughts were much more intrusive than they had been in the past, which prompted her to schedule this appointment. She has never had a plan for suicide. Anastasia states she sleeps very well - no trouble falling or staying asleep. Does report increase in appetite and breast tenderness during the days leading up to her menses, denies headaches or bloating. All of her symptoms resolve 1-2 days after her menses begin and she remains feeling well during the follicular days of her cycle. Anastasia is seeing a therapist once per week.  Her daughter committed suicide a couple months ago.   Anastasia is also attending a survivors group on  to help her grieve with the loss of her child.  Anastasia has not taken any medications for anxiety or depression since her teenage years. At that time, she took Prozac, which helped, but she expienced the side effect of drowsiness.        Anastasia states she feels safe. Denies suicidal thoughts today.     Menses: q21-28 days, bleeding is light to moderate and lasts " "for 6 days    Current contraception: partner had a vasectomy  - Anastasia has been on birth control pills in the past, last in her 20's and felt that they made her mood worsen.     OBJECTIVE:  /71  Pulse 92  Ht 1.6 m (5' 2.99\")  Wt 49.9 kg (110 lb 1.6 oz)  LMP 07/18/2018  BMI 19.51 kg/m2  General: pleasant female in no acute distress, well-groomed   Psych: normal mentation  Musculoskeletal: no gross deformities     ASSESSMENT:   Adjustment disorder with mixed anxiety and depressed mood  (primary encounter diagnosis)  PMDD (premenstrual dysphoric disorder)    PLAN:   Discussed options for management of PMDD symptoms, including a hormonal method to prevent ovulation or antidepressant and antianxiety medication to more specifically control her mental health symptoms. Anastasia expressed interest in initiating a serotonin specific reuptake inhibitor, but expressed concern that Prozac made her drowsy when she took it many yrs ago and she does not desire to feel that side effect, again. Consulted with Dr. Amezquita, Pharm D, who recommended Celexa or Fluoxetine, although Celexa may be better for anxiety or panic like symptoms. Anastasia desires to consider these options and notify provider of preference by MyChart.  Explained to Anastasia that the [facility] is a mental health ER; she should present there, or any other ER, if she has suicidal thoughts.    She left, stable; she expressed understanding and agreement with the plan for care.    A total of 25 minutes was spent in direct contact with the patient and > 50% of the time in patient education and coordination of care.      Again, thank you for allowing me to participate in the care of your patient.      Sincerely,    Isi Bartlett, APRN CNP      "

## 2018-08-03 ENCOUNTER — MYC MEDICAL ADVICE (OUTPATIENT)
Dept: OBGYN | Facility: CLINIC | Age: 43
End: 2018-08-03

## 2018-08-03 DIAGNOSIS — F32.81 PMDD (PREMENSTRUAL DYSPHORIC DISORDER): Primary | ICD-10-CM

## 2018-08-07 ENCOUNTER — MYC MEDICAL ADVICE (OUTPATIENT)
Dept: FAMILY MEDICINE | Facility: CLINIC | Age: 43
End: 2018-08-07

## 2018-08-07 NOTE — TELEPHONE ENCOUNTER
Dr. Lundberg    Please see mychart message and IMG from the pt.  Pt is allergic to tetracycline.  Please advise.  Thanks.    Jarrod-Mi

## 2018-08-08 NOTE — TELEPHONE ENCOUNTER
A little hard to tell from the picture but likely not a Lyme rash--ask her to come in and see one of us tmrw (Wed)

## 2019-05-02 ENCOUNTER — TELEPHONE (OUTPATIENT)
Dept: OBGYN | Facility: CLINIC | Age: 44
End: 2019-05-02

## 2019-05-02 DIAGNOSIS — B00.9 HSV (HERPES SIMPLEX VIRUS) INFECTION: Primary | ICD-10-CM

## 2019-05-02 NOTE — TELEPHONE ENCOUNTER
Received phone call from Anastasia. 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     Patient states she in the last day has began to notice a painful spot inside labia which she says is difficult to visualize but is located to one spot and notes burning, tingling, and a raw feeling at this spot. Denies fever. Does state she has noticed swollen glands in armpit areas bilaterally.     Patient unable to come to clinic as she is heading out of town this morning to a remote cabin and is concerned about symptoms while traveling.

## 2019-05-03 ENCOUNTER — HEALTH MAINTENANCE LETTER (OUTPATIENT)
Age: 44
End: 2019-05-03

## 2019-05-03 RX ORDER — VALACYCLOVIR HYDROCHLORIDE 1 G/1
1000 TABLET, FILM COATED ORAL 2 TIMES DAILY
Qty: 20 TABLET | Refills: 0 | Status: SHIPPED | OUTPATIENT
Start: 2019-05-03 | End: 2019-07-23

## 2019-05-03 NOTE — TELEPHONE ENCOUNTER
"Called Anastasia to discuss her symptoms. Anastasia noted new onset of lesion inside of her labia that has not changed in size overtime. Notes tingling, burning, and pain at this site; feels \"raw.\" Has body aches; denies fever. Also noted swelling of lymph nodes in her armpits bilaterally. Anastasia recently had contact with a partner who had active HSV genital lesions.  Anastasia is out of town currently (at a remote cabin in [facility]) and is unable to come in to clinic for culture of the lesion.  Offered presumptive treatment for HSV with Valtrex given recent exposure and presentation of symptoms. Anastasia desires prescription; Rx placed.     Counseled Anastasia that she may return to clinic to complete a blood test for HSV or wait until she notes another outbreak to return to clinic at that time for culture of the lesion to confirm diagnosis. Reviewed clinical course of HSV and how it can vary greatly from one person to another.    Answered all of Anastasia's questions before ending the call.  She expressed understanding and agreement with the plan for care.    Isi Bartlett, DNP, APRN, WHNP    "

## 2019-06-11 ENCOUNTER — ANCILLARY PROCEDURE (OUTPATIENT)
Dept: MAMMOGRAPHY | Facility: CLINIC | Age: 44
End: 2019-06-11
Attending: FAMILY MEDICINE
Payer: COMMERCIAL

## 2019-06-11 DIAGNOSIS — Z12.31 SCREENING MAMMOGRAM, ENCOUNTER FOR: ICD-10-CM

## 2019-07-07 DIAGNOSIS — E03.9 HYPOTHYROIDISM, UNSPECIFIED TYPE: ICD-10-CM

## 2019-07-09 RX ORDER — LEVOTHYROXINE SODIUM 75 UG/1
TABLET ORAL
Qty: 90 TABLET | Refills: 0 | OUTPATIENT
Start: 2019-07-09

## 2019-07-09 RX ORDER — LEVOTHYROXINE SODIUM 75 UG/1
TABLET ORAL
Qty: 90 TABLET | Refills: 0 | Status: SHIPPED | OUTPATIENT
Start: 2019-07-09 | End: 2019-10-15

## 2019-07-09 NOTE — TELEPHONE ENCOUNTER
Last Clinic Visit: 5/3/2018 [facility]  Failed protocol for overdue appointment and TSH. 90 day ila approved and message to clinic CMA and pharmacy

## 2019-07-23 ENCOUNTER — OFFICE VISIT (OUTPATIENT)
Dept: OBGYN | Facility: CLINIC | Age: 44
End: 2019-07-23
Attending: NURSE PRACTITIONER
Payer: COMMERCIAL

## 2019-07-23 VITALS
BODY MASS INDEX: 21.33 KG/M2 | SYSTOLIC BLOOD PRESSURE: 102 MMHG | HEIGHT: 63 IN | DIASTOLIC BLOOD PRESSURE: 70 MMHG | HEART RATE: 69 BPM | WEIGHT: 120.4 LBS

## 2019-07-23 DIAGNOSIS — F41.9 ANXIETY AND DEPRESSION: Primary | ICD-10-CM

## 2019-07-23 DIAGNOSIS — F32.81 PMDD (PREMENSTRUAL DYSPHORIC DISORDER): ICD-10-CM

## 2019-07-23 DIAGNOSIS — F32.A ANXIETY AND DEPRESSION: Primary | ICD-10-CM

## 2019-07-23 PROCEDURE — G0463 HOSPITAL OUTPT CLINIC VISIT: HCPCS | Mod: ZF

## 2019-07-23 ASSESSMENT — PATIENT HEALTH QUESTIONNAIRE - PHQ9
SUM OF ALL RESPONSES TO PHQ QUESTIONS 1-9: 11
5. POOR APPETITE OR OVEREATING: NEARLY EVERY DAY

## 2019-07-23 ASSESSMENT — PAIN SCALES - GENERAL: PAINLEVEL: NO PAIN (0)

## 2019-07-23 ASSESSMENT — ANXIETY QUESTIONNAIRES
1. FEELING NERVOUS, ANXIOUS, OR ON EDGE: NEARLY EVERY DAY
5. BEING SO RESTLESS THAT IT IS HARD TO SIT STILL: SEVERAL DAYS
3. WORRYING TOO MUCH ABOUT DIFFERENT THINGS: NEARLY EVERY DAY
2. NOT BEING ABLE TO STOP OR CONTROL WORRYING: MORE THAN HALF THE DAYS
6. BECOMING EASILY ANNOYED OR IRRITABLE: SEVERAL DAYS
7. FEELING AFRAID AS IF SOMETHING AWFUL MIGHT HAPPEN: NEARLY EVERY DAY
GAD7 TOTAL SCORE: 16

## 2019-07-23 ASSESSMENT — MIFFLIN-ST. JEOR: SCORE: 1175.26

## 2019-07-23 NOTE — LETTER
2019       RE: Anastasia Colbert  [address]  [place] [address]     Dear Colleague,    Thank you for referring your patient, Anastasia Colbert, to the [facility] at [facility]. Please see a copy of my visit note below.    Subjective: Anastasia Colbert (Meg) is a 43 yo female, , who presents today with worsening depression, anxiety, and PMDD symptoms. Pt was seen in 2018 and was started on 50mg Sertraline for similar symptoms. Reports taking sertraline for 3 months and discontinued due to 'no improvement in symptoms'; denies any side effects with medication use. Did not return to clinic for follow up or dose adjustment. Reports 'variable' mood, it can go from 'perfectly fine to really depressed'. Pt reports high anxiety in the luteal phase with symptoms of a racing heart, then mood 'crashes with deep depression' within 2 days of her period starting. In those 2 days, pt reports more intrusive SI, but has never had a suicide plan. States having suicidal thoughts since age 14. After those first 2 days of her period, her mood returns to her baseline anxiety. Symptoms have slightly worsened since last year's appointment. Denies appetite changes, or problems sleeping, but states as soon as she wakes up she instantly feels anxious. Misses work at least 1x/month due to symptoms. Denies symptoms of jaiden. 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 Pt states things had been 'manas' a few months ago in her long-term relationship as he has mental health issues himself, but that is under control now; reports he is supportive. He has vasectomy and pt denies need for contraception. LMP 19. Of note, pts daughter committed suicide just over a year ago. Pt sees a therapist weekly, and attends a survivors group on . Pt had tried fluoxetine in the past around age 18 and is interested in restarting that.    PHQ-9 SCORE  "10/14/2016 7/23/2019   PHQ-9 Total Score 11 11     JONATHAN-7 SCORE 10/10/2016 8/1/2017 7/23/2019   Total Score - 7 (mild anxiety) -   Total Score 12 7 16     Past Medical History:   Diagnosis Date     Abnormal glandular Papanicolaou smear of cervix 2001    LEEP     Allergic rhinitis      Anxiety 2000     Depressive disorder 1992     Depressive disorder, not elsewhere classified     hx of as teen (hosp 1993)     Eating disorder, unspecified     Hx of      Hoarseness a few years     Intestinal bacterial overgrowth      Unspecified hypothyroidism     Borderline- TSH 5.20 6/19/06. On levoxyl 50 mcg 44/05     Past Surgical History:   Procedure Laterality Date     C ORAL SURGERY PROCEDURE  1989    impacted bicuspids     C PSYCHIATRIC SERVICE/THERAPY  1993    depression     COLONOSCOPY  2013     COLPOSCOPY,LOOP ELECTRD CERVIX EXCIS  2001    normal 6/19/06     HC BREATH HYDROGEN TEST  3/21/2013    Procedure: HYDROGEN BREATH TEST;  Surgeon: Matt Briceno MD;  Location:  GI     Family History   Problem Relation Age of Onset     Depression Mother      Gynecology Mother         hyst     Thyroid Disease Mother         hypo     Lipids Father         no meds     Cancer Maternal Grandmother         kidney     Other Cancer Maternal Grandmother         kidney cancer     Depression Maternal Grandmother      Thyroid Disease Maternal Grandmother      Depression Maternal Grandfather      Cancer Paternal Grandmother         bladder     Genitourinary Problems Paternal Grandmother         bladder     Other Cancer Paternal Grandmother         bladder cancer     Arthritis Paternal Grandfather      Cerebrovascular Disease Paternal Grandfather         ,     Heart Disease Paternal Grandfather      Dementia Paternal Grandfather      Other Cancer Paternal Grandfather         unknown origin     Depression Maternal Uncle      Thyroid Disease Maternal Aunt         hypo           Objective:  /70   Pulse 69   Ht 1.6 m (5' 3\")   Wt 54.6 " kg (120 lb 6.4 oz)   LMP 07/12/2019   Breastfeeding? No   BMI 21.33 kg/m     General: pleasant female in no acute distress  Musculoskeletal: no gross deformities  Head: normocephalic, atraumatic   Psych: bright affect, normal mentation      Assessment:  Encounter Diagnoses   Name Primary?     Anxiety and depression Yes     PMDD (premenstrual dysphoric disorder)        Plan:  Start 20mg fluoxetine daily in the AM, increase dose to 40mg daily after 1 week. If unable to tolerate medication side effects, or if mood worsens, pt instructed to RTC. Side effects discussed including headache, GI upset, sleep disturbance, and decreased libido and adverse effect of worsening mental health symptoms.     RTC in 2-4 weeks to follow up on depressive symptoms and discuss medication toleration and titration if needed.      Instructed medication cannot be abruptly discontinued, and is instructed to decrease dose weekly if she decides to discontinue medication.     Instructed if suicidal ideation worsens or pt has a plan, go to the closest ER.     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     Patient expressed understanding and agreement with the plan for care.    I, Ernestine Wilson, completed the PFSH and ROS. I then acted as a scribe for MARLI Lazar, for the remainder of the visit.  Ernestine Wilson, RN, BSN, DNP Student, MARLI Specialty    I agree with the PFSH and ROS as completed by the MARLI Student, except for changes made by me.  The remainder of the encounter was performed by me and scribed by the MARLI Student.  The scribed note accurately reflects my personal services and decisions made by me.  Isi Bartlett DNP, APRLYNETTE, MARLI    A total of 25 minutes was spent in direct contact with the patient and > 50% of the time in patient education and coordination of care.

## 2019-07-23 NOTE — PATIENT INSTRUCTIONS
Start Fluoxetine 20mg today. Take one capsule each morning.  If tolerating well and no bothersome side effects, may increase dose to 40mg daily.   Take in the morning.      Return to clinic in 2-4 weeks.    Continue with therapist.  Go to the ER with worsening suicidal ideation.

## 2019-07-23 NOTE — PROGRESS NOTES
Subjective: Anastasia Colbert (Meg) is a 45 yo female, , who presents today with worsening depression, anxiety, and PMDD symptoms. Pt was seen in 2018 and was started on 50mg Sertraline for similar symptoms. Reports taking sertraline for 3 months and discontinued due to 'no improvement in symptoms'; denies any side effects with medication use. Did not return to clinic for follow up or dose adjustment. Reports 'variable' mood, it can go from 'perfectly fine to really depressed'. Pt reports high anxiety in the luteal phase with symptoms of a racing heart, then mood 'crashes with deep depression' within 2 days of her period starting. In those 2 days, pt reports more intrusive SI, but has never had a suicide plan. States having suicidal thoughts since age 14. After those first 2 days of her period, her mood returns to her baseline anxiety. Symptoms have slightly worsened since last year's appointment. Denies appetite changes, or problems sleeping, but states as soon as she wakes up she instantly feels anxious. Misses work at least 1x/month due to symptoms. Denies symptoms of jaiden. 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 Pt states things had been 'manas' a few months ago in her long-term relationship as he has mental health issues himself, but that is under control now; reports he is supportive. He has vasectomy and pt denies need for contraception. LMP 19. Of note, pts daughter committed suicide just over a year ago. Pt sees a therapist weekly, and attends a survivors group on . Pt had tried fluoxetine in the past around age 18 and is interested in restarting that.    PHQ-9 SCORE 10/14/2016 2019   PHQ-9 Total Score 11 11     JONATHAN-7 SCORE 10/10/2016 2017 2019   Total Score - 7 (mild anxiety) -   Total Score 12 7 16     Past Medical History:   Diagnosis Date     Abnormal glandular Papanicolaou smear of cervix     LEEP     Allergic rhinitis      Anxiety       "Depressive disorder 1992     Depressive disorder, not elsewhere classified     hx of as teen (hosp 1993)     Eating disorder, unspecified     Hx of      Hoarseness a few years     Intestinal bacterial overgrowth      Unspecified hypothyroidism     Borderline- TSH 5.20 6/19/06. On levoxyl 50 mcg 44/05     Past Surgical History:   Procedure Laterality Date     C ORAL SURGERY PROCEDURE  1989    impacted bicuspids     C PSYCHIATRIC SERVICE/THERAPY  1993    depression     COLONOSCOPY  2013     COLPOSCOPY,LOOP ELECTRD CERVIX EXCIS  2001    normal 6/19/06     HC BREATH HYDROGEN TEST  3/21/2013    Procedure: HYDROGEN BREATH TEST;  Surgeon: Matt Briceno MD;  Location:  GI     Family History   Problem Relation Age of Onset     Depression Mother      Gynecology Mother         hyst     Thyroid Disease Mother         hypo     Lipids Father         no meds     Cancer Maternal Grandmother         kidney     Other Cancer Maternal Grandmother         kidney cancer     Depression Maternal Grandmother      Thyroid Disease Maternal Grandmother      Depression Maternal Grandfather      Cancer Paternal Grandmother         bladder     Genitourinary Problems Paternal Grandmother         bladder     Other Cancer Paternal Grandmother         bladder cancer     Arthritis Paternal Grandfather      Cerebrovascular Disease Paternal Grandfather         ,     Heart Disease Paternal Grandfather      Dementia Paternal Grandfather      Other Cancer Paternal Grandfather         unknown origin     Depression Maternal Uncle      Thyroid Disease Maternal Aunt         hypo           Objective:  /70   Pulse 69   Ht 1.6 m (5' 3\")   Wt 54.6 kg (120 lb 6.4 oz)   LMP 07/12/2019   Breastfeeding? No   BMI 21.33 kg/m    General: pleasant female in no acute distress  Musculoskeletal: no gross deformities  Head: normocephalic, atraumatic   Psych: bright affect, normal mentation      Assessment:  Encounter Diagnoses   Name Primary?     Anxiety " and depression Yes     PMDD (premenstrual dysphoric disorder)        Plan:  Start 20mg fluoxetine daily in the AM, increase dose to 40mg daily after 1 week. If unable to tolerate medication side effects, or if mood worsens, pt instructed to RTC. Side effects discussed including headache, GI upset, sleep disturbance, and decreased libido and adverse effect of worsening mental health symptoms.     RTC in 2-4 weeks to follow up on depressive symptoms and discuss medication toleration and titration if needed.      Instructed medication cannot be abruptly discontinued, and is instructed to decrease dose weekly if she decides to discontinue medication.     Instructed if suicidal ideation worsens or pt has a plan, go to the closest ER.     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     Patient expressed understanding and agreement with the plan for care.    I, Ernestine Wilson, completed the PFSH and ROS. I then acted as a scribe for MARLI Lazar, for the remainder of the visit.  Ernestine Wilson, RN, BSN, DNP Student, [facility]    I agree with the PFSH and ROS as completed by the MRALI Student, except for changes made by me.  The remainder of the encounter was performed by me and scribed by the MARLI Student.  The scribed note accurately reflects my personal services and decisions made by me.  Isi Bartlett DNP, APRN, MARLI    A total of 25 minutes was spent in direct contact with the patient and > 50% of the time in patient education and coordination of care.

## 2019-07-24 ASSESSMENT — ANXIETY QUESTIONNAIRES: GAD7 TOTAL SCORE: 16

## 2019-08-26 ENCOUNTER — MYC REFILL (OUTPATIENT)
Dept: OBGYN | Facility: CLINIC | Age: 44
End: 2019-08-26

## 2019-08-26 DIAGNOSIS — F32.A ANXIETY AND DEPRESSION: ICD-10-CM

## 2019-08-26 DIAGNOSIS — F41.9 ANXIETY AND DEPRESSION: ICD-10-CM

## 2019-08-26 RX ORDER — FLUOXETINE 40 MG/1
CAPSULE ORAL
Qty: 30 CAPSULE | Refills: 0 | Status: SHIPPED | OUTPATIENT
Start: 2019-08-26 | End: 2019-09-24

## 2019-09-24 ENCOUNTER — MYC REFILL (OUTPATIENT)
Dept: OBGYN | Facility: CLINIC | Age: 44
End: 2019-09-24

## 2019-09-24 DIAGNOSIS — F32.A ANXIETY AND DEPRESSION: ICD-10-CM

## 2019-09-24 DIAGNOSIS — F41.9 ANXIETY AND DEPRESSION: ICD-10-CM

## 2019-09-27 RX ORDER — FLUOXETINE 40 MG/1
CAPSULE ORAL
Qty: 30 CAPSULE | Refills: 0 | Status: SHIPPED | OUTPATIENT
Start: 2019-09-27 | End: 2019-10-29

## 2019-09-27 NOTE — TELEPHONE ENCOUNTER
Received refill request for Prozac.  Last in clinic July 2019. Plan was for followup in 2-4 weeks. Responded to patient's Pan[employer]ve message indicating appropriate to schedule followup next month and that short-term refill sent.

## 2019-10-15 ENCOUNTER — MYC REFILL (OUTPATIENT)
Dept: FAMILY MEDICINE | Facility: CLINIC | Age: 44
End: 2019-10-15

## 2019-10-15 DIAGNOSIS — E03.9 HYPOTHYROIDISM, UNSPECIFIED TYPE: ICD-10-CM

## 2019-10-16 RX ORDER — LEVOTHYROXINE SODIUM 75 UG/1
75 TABLET ORAL DAILY
Qty: 30 TABLET | Refills: 0 | Status: SHIPPED | OUTPATIENT
Start: 2019-10-16 | End: 2019-11-11

## 2019-10-29 ENCOUNTER — OFFICE VISIT (OUTPATIENT)
Dept: OBGYN | Facility: CLINIC | Age: 44
End: 2019-10-29
Attending: NURSE PRACTITIONER
Payer: COMMERCIAL

## 2019-10-29 VITALS
BODY MASS INDEX: 21 KG/M2 | DIASTOLIC BLOOD PRESSURE: 70 MMHG | HEIGHT: 63 IN | HEART RATE: 79 BPM | WEIGHT: 118.5 LBS | SYSTOLIC BLOOD PRESSURE: 106 MMHG

## 2019-10-29 DIAGNOSIS — F41.9 ANXIETY AND DEPRESSION: ICD-10-CM

## 2019-10-29 DIAGNOSIS — F32.A ANXIETY AND DEPRESSION: ICD-10-CM

## 2019-10-29 DIAGNOSIS — Z20.828 EXPOSURE TO HERPES: ICD-10-CM

## 2019-10-29 DIAGNOSIS — F43.10 PTSD (POST-TRAUMATIC STRESS DISORDER): ICD-10-CM

## 2019-10-29 DIAGNOSIS — F32.81 PMDD (PREMENSTRUAL DYSPHORIC DISORDER): Primary | ICD-10-CM

## 2019-10-29 DIAGNOSIS — E03.9 HYPOTHYROIDISM, UNSPECIFIED TYPE: ICD-10-CM

## 2019-10-29 LAB — TSH SERPL DL<=0.005 MIU/L-ACNC: 1.65 MU/L (ref 0.4–4)

## 2019-10-29 PROCEDURE — 36415 COLL VENOUS BLD VENIPUNCTURE: CPT | Performed by: NURSE PRACTITIONER

## 2019-10-29 PROCEDURE — 84443 ASSAY THYROID STIM HORMONE: CPT | Performed by: NURSE PRACTITIONER

## 2019-10-29 PROCEDURE — 86696 HERPES SIMPLEX TYPE 2 TEST: CPT | Performed by: NURSE PRACTITIONER

## 2019-10-29 PROCEDURE — 86695 HERPES SIMPLEX TYPE 1 TEST: CPT | Performed by: NURSE PRACTITIONER

## 2019-10-29 RX ORDER — FLUOXETINE 40 MG/1
CAPSULE ORAL
Qty: 30 CAPSULE | Refills: 3 | Status: SHIPPED | OUTPATIENT
Start: 2019-10-29 | End: 2019-12-01

## 2019-10-29 ASSESSMENT — ANXIETY QUESTIONNAIRES
3. WORRYING TOO MUCH ABOUT DIFFERENT THINGS: SEVERAL DAYS
2. NOT BEING ABLE TO STOP OR CONTROL WORRYING: SEVERAL DAYS
7. FEELING AFRAID AS IF SOMETHING AWFUL MIGHT HAPPEN: SEVERAL DAYS
GAD7 TOTAL SCORE: 10
6. BECOMING EASILY ANNOYED OR IRRITABLE: SEVERAL DAYS
5. BEING SO RESTLESS THAT IT IS HARD TO SIT STILL: SEVERAL DAYS
1. FEELING NERVOUS, ANXIOUS, OR ON EDGE: MORE THAN HALF THE DAYS

## 2019-10-29 ASSESSMENT — MIFFLIN-ST. JEOR: SCORE: 1166.64

## 2019-10-29 ASSESSMENT — PATIENT HEALTH QUESTIONNAIRE - PHQ9
SUM OF ALL RESPONSES TO PHQ QUESTIONS 1-9: 7
5. POOR APPETITE OR OVEREATING: NEARLY EVERY DAY

## 2019-10-29 NOTE — PROGRESS NOTES
"Subjective  Jessica is a 44-year old here to follow-up on mood and Fluoxetine.      1. She was started on Fluoxetine 7/23/19 for anxiety, depression, and PMDD.  She states that she is happy on this medication and dose (40mg) and would like to continue it.  She reports that the suicidal ideation is \"completely gone\".  She also states that her PMDD and PTSD are much better.  Her only complaints are that she is waking 1-2 times/night (x30-60 minutes) and having difficulty falling back to sleep; and she is clenching her jaw throughout the day.  This is new for her since starting the medication.  Pt is seeing a therapist (Alphonso Wynn) on a weekly basis to work on long-term trauma, which she finds very helpful.  She is also requesting a psych referral to see if there are additional ways to manage her mood symptoms, as she states she feels like she is more complicated d/t h/o PMDD, PTSD, and ADD. When she wakes at night she tries deep breathing and meditation to help her fall back asleep.     2. She requests to have her TSH re-checked.  She hasn't had this checked since 2/2018.  She is taking 75mcg levothyroxine daily.     3. She requests HSV serologic testing.  She reports that her current partner was diagnosed with genital HSV in April and Jessica was possibly exposed.  She states that she had \"possible\" symptoms of an outbreak in May, reporting general genital discomfort and a possible lesion.  No current genital symptoms.  She is interested in knowing if she carries this virus, as she is unsure if she'll have other partners in the future.    Objective:  /70   Pulse 79   Ht 1.6 m (5' 3\")   Wt 53.8 kg (118 lb 8 oz)   BMI 20.99 kg/m    Physical exam not indicated.    Patient is well-appearing.  Her mood and affect are appropriate.  She makes good eye contact and asks appropriate questions.  PHQ-9 and JONATHAN-7 scores improved from 7/23/19 as listed below.    PHQ-9 SCORE 10/14/2016 7/23/2019 10/29/2019   PHQ-9 Total Score 11 " 11 7     JONATHAN-7 SCORE 8/1/2017 7/23/2019 10/29/2019   Total Score 7 (mild anxiety) - -   Total Score 7 16 10     Assessment:  Anxiety, PTSD, depression, and PMDD; well-managed on Fluoxetine 40mg  Exposure to herpes  Hypothyroidism    Plan  Continue on Fluoxetine 40mg daily.  Rx submitted.  Continue psychotherapy.  Psychiatry referral placed for medication consult given side effects she is experiencing.     Continue deep breathing and/or meditation when waking at night.  Consider trying melatonin 5mg at bedtime to see if this helps her stay asleep.  Continue self-care regimens.    TSH future lab order placed by Dr Lundberg on 7/22/19.  Encouraged pt to have this lab draw done.    Provided education regarding the difference between HSV I and HSV II; and the difference in information that is gained from an HSV culture vs serologic testing.  Serologic HSV testing ordered, per patient's request.    Jessica left, stable. She expressed understanding and agreement for the plan for care.    I, Thea Montgomery, completed the PFSH and ROS. I then acted as a scribe for MARLI Lazar, for the remainder of the visit.  CELINA Bailey, RN, KAYLYNNNP Student    I agree with the PFSH and ROS as completed by the MARLI Student, except for changes made by me.  The remainder of the encounter was performed by me and scribed by the MARLI Student.  The scribed note accurately reflects my personal services and decisions made by me.  Isi Bartlett, KELLY, APRN, MARLI

## 2019-10-29 NOTE — LETTER
"10/29/2019       RE: Anastasia Colbert  [address]  [place] [address]     Dear Colleague,    Thank you for referring your patient, Anastasia Colbert, to the [facility] at [facility]. Please see a copy of my visit note below.    Subjective  Jessica is a 44-year old here to follow-up on mood and Fluoxetine.      1. She was started on Fluoxetine 7/23/19 for anxiety, depression, and PMDD.  She states that she is happy on this medication and dose (40mg) and would like to continue it.  She reports that the suicidal ideation is \"completely gone\".  She also states that her PMDD and PTSD are much better.  Her only complaints are that she is waking 1-2 times/night (x30-60 minutes) and having difficulty falling back to sleep; and she is clenching her jaw throughout the day.  This is new for her since starting the medication.  Pt is seeing a therapist (Alphonso Wynn) on a weekly basis to work on long-term trauma, which she finds very helpful.  She is also requesting a psych referral to see if there are additional ways to manage her mood symptoms, as she states she feels like she is more complicated d/t h/o PMDD, PTSD, and ADD. When she wakes at night she tries deep breathing and meditation to help her fall back asleep.     2. She requests to have her TSH re-checked.  She hasn't had this checked since 2/2018.  She is taking 75mcg levothyroxine daily.     3. She requests HSV serologic testing.  She reports that her current partner was diagnosed with genital HSV in April and Jessica was possibly exposed.  She states that she had \"possible\" symptoms of an outbreak in May, reporting general genital discomfort and a possible lesion.  No current genital symptoms.  She is interested in knowing if she carries this virus, as she is unsure if she'll have other partners in the future.    Objective:  /70   Pulse 79   Ht 1.6 m (5' 3\")   Wt 53.8 kg (118 lb 8 oz)   " BMI 20.99 kg/m     Physical exam not indicated.    Patient is well-appearing.  Her mood and affect are appropriate.  She makes good eye contact and asks appropriate questions.  PHQ-9 and JONATHAN-7 scores improved from 7/23/19 as listed below.    PHQ-9 SCORE 10/14/2016 7/23/2019 10/29/2019   PHQ-9 Total Score 11 11 7     JONATHAN-7 SCORE 8/1/2017 7/23/2019 10/29/2019   Total Score 7 (mild anxiety) - -   Total Score 7 16 10     Assessment:  Anxiety, PTSD, depression, and PMDD; well-managed on Fluoxetine 40mg  Exposure to herpes  Hypothyroidism    Plan  Continue on Fluoxetine 40mg daily.  Rx submitted.  Continue psychotherapy.  Psychiatry referral placed for medication consult given side effects she is experiencing.     Continue deep breathing and/or meditation when waking at night.  Consider trying melatonin 5mg at bedtime to see if this helps her stay asleep.  Continue self-care regimens.    TSH future lab order placed by Dr Lundberg on 7/22/19.  Encouraged pt to have this lab draw done.    Provided education regarding the difference between HSV I and HSV II; and the difference in information that is gained from an HSV culture vs serologic testing.  Serologic HSV testing ordered, per patient's request.    Jessica left, stable. She expressed understanding and agreement for the plan for care.    I, Thea Montgomery, completed the PFSH and ROS. I then acted as a scribe for MARLI Lazar, for the remainder of the visit.  CELINA Bailey, RN, MARLI Student    I agree with the PFSH and ROS as completed by the MARLI Student, except for changes made by me.  The remainder of the encounter was performed by me and scribed by the MARLI Student.  The scribed note accurately reflects my personal services and decisions made by me.    Isi Bartlett, DNP, APRN, MARLI

## 2019-10-30 ENCOUNTER — MYC MEDICAL ADVICE (OUTPATIENT)
Dept: OBGYN | Facility: CLINIC | Age: 44
End: 2019-10-30

## 2019-10-30 DIAGNOSIS — B00.9 HERPES SIMPLEX VIRUS INFECTION: Primary | ICD-10-CM

## 2019-10-30 LAB
HSV1 IGG SERPL QL IA: <0.2 AI (ref 0–0.8)
HSV2 IGG SERPL QL IA: 3.6 AI (ref 0–0.8)

## 2019-10-30 ASSESSMENT — ANXIETY QUESTIONNAIRES: GAD7 TOTAL SCORE: 10

## 2019-11-07 ENCOUNTER — HEALTH MAINTENANCE LETTER (OUTPATIENT)
Age: 44
End: 2019-11-07

## 2019-11-08 RX ORDER — VALACYCLOVIR HYDROCHLORIDE 500 MG/1
500 TABLET, FILM COATED ORAL DAILY
Qty: 90 TABLET | Refills: 3 | Status: SHIPPED | OUTPATIENT
Start: 2019-11-08 | End: 2020-04-09

## 2019-11-11 ENCOUNTER — MYC REFILL (OUTPATIENT)
Dept: FAMILY MEDICINE | Facility: CLINIC | Age: 44
End: 2019-11-11

## 2019-11-11 DIAGNOSIS — E03.9 HYPOTHYROIDISM, UNSPECIFIED TYPE: ICD-10-CM

## 2019-11-12 ENCOUNTER — TELEPHONE (OUTPATIENT)
Dept: FAMILY MEDICINE | Facility: CLINIC | Age: 44
End: 2019-11-12

## 2019-11-12 RX ORDER — LEVOTHYROXINE SODIUM 75 UG/1
75 TABLET ORAL DAILY
Qty: 15 TABLET | Refills: 0 | Status: SHIPPED | OUTPATIENT
Start: 2019-11-12 | End: 2019-12-03

## 2019-11-12 NOTE — TELEPHONE ENCOUNTER
----- Message from Keyla Milligan RN sent at 11/12/2019  7:32 AM CST -----  Regarding: Appt renee Lundberg  Please call to schedule.LVD February 2018    Thanks    I do not need any follow up on the scheduling of this appointment unless the patient will no longer be receiving care in our clinic.

## 2019-11-12 NOTE — TELEPHONE ENCOUNTER
levothyroxine (SYNTHROID/LEVOTHROID) 75 MCG tablet  Last Written Prescription Date:  10/16/2019  Last Fill Quantity: 30,   # refills: 0  Last Office Visit : 2/16/2018  Future Office visit:  None    Routing refill request to provider for review/approval because:  Appt due.  Requesting 90 day supply.  No appt in > 18 months.  Had TSH done recently 10/29/2019 per Dr Lundberg order of 2/2018    TSH 1.65   0.40 - 4.00 mU/L Final 10/29/2019  3:49    Scheduling has been notified to contact the pt for appointment.

## 2019-11-27 ENCOUNTER — TELEPHONE (OUTPATIENT)
Dept: PSYCHIATRY | Facility: CLINIC | Age: 44
End: 2019-11-27

## 2019-11-27 NOTE — TELEPHONE ENCOUNTER
PSYCHIATRY CLINIC PHONE INTAKE     SERVICES REQUESTED / INTERESTED IN          Med Management    Presenting Problem and Brief History                              What would you like to be seen for? (brief description):  Pt experiences hypervigilance, agitation, and sudden, severe mood changes.. She clenches her jaw and feels her heart racing. Her anxiety is heightened before her period, and then her mood crashes (she will feel suicidal, but not to the point of formulating a plan). After her period ends, she experiences slightly elevated mood. Fluoxetine has caused sleep disturbances. She has a hx of trauma in both childhood and adulthood.    Have you received a mental health diagnosis? Yes   Which one (s): PTSD, PMDD  Are you currently seeing a mental health provider?  Yes            Who / month last seen:  Aplhonso Trimble, therapist / last seen 2 weeks ago  Do you have mental health records elsewhere?  Yes  Will you sign a release so we can obtain them?  Yes    Have you ever been hospitalized for psychiatric reasons?  Yes  Describe:  In 1993    Do you have current thoughts of self-harm?  No    Do you currently have thoughts of harming others?  No       Substance Use History     Do you have any history of alcohol / illicit drug use?  Yes  Describe:  Last year she was doing some binge drinking after her daughter passed away, but she said she does not drink much anymore  Have you ever received treatment for this?  No         Social History      Do you have any current or past legal issues?  No    OK to leave a detailed voicemail?  Yes    Medical/ Surgical History                                   Patient Active Problem List   Diagnosis     Hypothyroidism     Abnormal glandular Papanicolaou smear of cervix     Dysphonia          Medications             Probiotic, fluoxetine, Synthroid    DISPOSITION      Completed phone screen with patient. Pt would like to see an NP (no gender preference), but if the wait time to see a  resident is much shorter, she is okay with seeing a resident. Added to AGE wait list.    -Rachna Young,

## 2019-12-01 ENCOUNTER — MYC MEDICAL ADVICE (OUTPATIENT)
Dept: OBGYN | Facility: CLINIC | Age: 44
End: 2019-12-01

## 2019-12-01 ENCOUNTER — MYC REFILL (OUTPATIENT)
Dept: OBGYN | Facility: CLINIC | Age: 44
End: 2019-12-01

## 2019-12-01 DIAGNOSIS — F41.9 ANXIETY AND DEPRESSION: ICD-10-CM

## 2019-12-01 DIAGNOSIS — E03.9 HYPOTHYROIDISM, UNSPECIFIED TYPE: ICD-10-CM

## 2019-12-01 DIAGNOSIS — F32.A ANXIETY AND DEPRESSION: ICD-10-CM

## 2019-12-03 RX ORDER — LEVOTHYROXINE SODIUM 75 UG/1
75 TABLET ORAL DAILY
Qty: 90 TABLET | Refills: 3 | Status: SHIPPED | OUTPATIENT
Start: 2019-12-03 | End: 2020-04-12

## 2019-12-04 RX ORDER — FLUOXETINE 40 MG/1
CAPSULE ORAL
Qty: 30 CAPSULE | Refills: 3 | Status: SHIPPED | OUTPATIENT
Start: 2019-12-04 | End: 2020-04-09

## 2019-12-04 NOTE — TELEPHONE ENCOUNTER
Received refill request for Proazc.  Last in clinic October 2019 and plan was to continue this medication. Refill sent.

## 2020-02-23 ENCOUNTER — HEALTH MAINTENANCE LETTER (OUTPATIENT)
Age: 45
End: 2020-02-23

## 2020-04-09 ENCOUNTER — TELEPHONE (OUTPATIENT)
Dept: PSYCHIATRY | Facility: CLINIC | Age: 45
End: 2020-04-09

## 2020-04-09 ENCOUNTER — VIRTUAL VISIT (OUTPATIENT)
Dept: PSYCHIATRY | Facility: CLINIC | Age: 45
End: 2020-04-09
Attending: NURSE PRACTITIONER
Payer: COMMERCIAL

## 2020-04-09 DIAGNOSIS — F31.81 BIPOLAR 2 DISORDER (H): Primary | ICD-10-CM

## 2020-04-09 DIAGNOSIS — F43.10 PTSD (POST-TRAUMATIC STRESS DISORDER): ICD-10-CM

## 2020-04-09 DIAGNOSIS — Z79.899 MEDICATION MANAGEMENT: ICD-10-CM

## 2020-04-09 RX ORDER — ARIPIPRAZOLE 5 MG/1
5 TABLET ORAL DAILY
Qty: 30 TABLET | Refills: 1 | Status: SHIPPED | OUTPATIENT
Start: 2020-04-09 | End: 2020-05-07
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
## 2020-04-09 NOTE — TELEPHONE ENCOUNTER
On 4/9/2020, at 7:45, writer called patient at [phone] to confirm Virtual Visit. Writer unable to make contact with patient. Writer left detailed voice message for callback. [phone], left as call back number. Fina Castillo, [facility]

## 2020-04-09 NOTE — PATIENT INSTRUCTIONS
-Start Abilify 5 mg daily, monitor for sleep patterns and other hypomanic symptoms possibly  -Complete labs when the pandemic is better controlled.  One of the lab needs 10 hr fasting.    Your follow up appointment is on 5/7 (Thu) at 9am.

## 2020-04-12 ENCOUNTER — MYC REFILL (OUTPATIENT)
Dept: OBGYN | Facility: CLINIC | Age: 45
End: 2020-04-12

## 2020-04-12 DIAGNOSIS — E03.9 HYPOTHYROIDISM, UNSPECIFIED TYPE: ICD-10-CM

## 2020-04-14 RX ORDER — LEVOTHYROXINE SODIUM 75 UG/1
75 TABLET ORAL DAILY
Qty: 90 TABLET | Refills: 3 | Status: SHIPPED | OUTPATIENT
Start: 2020-04-14 | End: 2021-04-05

## 2020-05-07 ENCOUNTER — VIRTUAL VISIT (OUTPATIENT)
Dept: PSYCHIATRY | Facility: CLINIC | Age: 45
End: 2020-05-07
Attending: NURSE PRACTITIONER
Payer: COMMERCIAL

## 2020-05-07 DIAGNOSIS — F31.81 BIPOLAR 2 DISORDER (H): Primary | ICD-10-CM

## 2020-05-07 DIAGNOSIS — F43.10 PTSD (POST-TRAUMATIC STRESS DISORDER): ICD-10-CM

## 2020-05-07 RX ORDER — ARIPIPRAZOLE 5 MG/1
5 TABLET ORAL DAILY
Qty: 90 TABLET | Refills: 0 | Status: SHIPPED | OUTPATIENT
Start: 2020-05-07 | End: 2020-06-19

## 2020-05-07 ASSESSMENT — PAIN SCALES - GENERAL: PAINLEVEL: NO PAIN (0)

## 2020-05-07 NOTE — PROGRESS NOTES
"VIDEO VISIT  Anastasia De Paz is a 44 year old patient who is being evaluated via a billable video visit.      The patient has been notified of following:   \"This video visit will be conducted via a call between you and your physician/provider. We have found that certain health care needs can be provided without the need for an in-person physical exam. This service lets us provide the care you need with a video conversation. If a prescription is necessary we can send it directly to your pharmacy. If lab work is needed we can place an order for that and you can then stop by our lab to have the test done at a later time. Insurers are generally covering virtual visits as they would in-office visits so billing should not be different than normal.  If for some reason you do get billed incorrectly, you should contact the billing office to correct it and that number is in the AVS .    Patient has given verbal consent for video visit?:  Yes     How would you like to obtain your AVS?:  [employer]    Video invitation for patient:  DOXY provider, invite not needed      AVS SmartPhrase [PsychAVS] has been placed in 'Patient Instructions':  Yes     Start Time:  0928         End Time: 0942    Telemedicine Visit: The patient's condition can be safely assessed and treated via synchronous audio and visual telemedicine encounter.      Reason for Telemedicine Visit: Due to COVID 19 pandemic, clinic switching all appointments to telemedicine     Originating Site (Patient Location): Patient's home    Distant Site (Provider Location): Provider Remote Setting    Consent:  The patient/guardian has verbally consented to: the potential risks and benefits of telemedicine (video visit) versus in person care; bill my insurance or make self-payment for services provided; and responsibility for payment of non-covered services.     Mode of Communication:  Video Conference via Doxy.    As the provider I attest to compliance with applicable laws and " regulations related to telemedicine.    Psychiatry Clinic Progress Note                                                                  Patient Name: Anastasia De Paz  YOB: 1975  MRN: [id number]  Date of Service:  5/7/2020  Last Seen:4/9/2020    Anastasia De Paz is a 44 year old female who uses the name Jessica and pronoun she.        Jessica De Paz is a 44 year old year old adult who presents for ongoing psychiatric care.  Jessica De Paz was last seen on 4/9/2020.     At that time,     Medication Ordered/Consults/Labs/tests Ordered:      Medication: Start Abilify 5 mg daily, monitor for sleep patterns and other hypomanic symptoms possibly  OTC Recommendations: none  Lab Orders:  CMP, CBC, A1C, lipid  Referrals: none  Release of Information: therapist when face to face  Future Treatment Considerations: per symptoms. Increase Abilify  Return for Follow Up: in 1 month      Pertinent Background:  Diagnoses include BPAD, PTSD, depression and anxiety, remote hx of disordered eating Psych critical item history includes suicide attempt [single], suicidal ideation, trauma hx, psych hosp (<3) and disordered eating of various types.   Original DA 4/9/2019    Previous Psychiatric Meds: Prozac (oversedation and jaw clenching), Zoloft (not effective, also unknown ADR)    Interim History                                                                                                        4, 4     Since the last visit, reports doing well.  Feels mood is more even.  Takes Abilify in AM, gets slightly more tired in the evening, but is not sure if it's due to medication or increased amount of work.  Working fully remotely.  Just finished menses cycle and usually this is the time chronic suicidal ideation come back with mood crashes, but she has not noticed none of these usual symptoms.  Continues to sleep well about 7-8 hours/night.  Pt is very happy with the medication as she does not have any side effects.   PTSD symptoms are only rare to occasional currently and feels optimally managed.    Denies any symptoms suggestive of hypomania or psychosis.    Current Suicidality/Hx of Suicide Attempts: Denies currently though hx of chronic suicidal ideation since adolescent, SA x 1 at age 18  CoCominent Medical concerns: Denies    Medication Side Effects: The patient denies all medication side effects.      Medical Review of Systems     Apart from the symptoms mentioned int he HPI, the 14 point review of systems, including constitutional, HEENT, cardiovascular, respiratory, gastrointestinal, genitourinary, musculoskeletal, integumentary, endocrine, neurological, hematologic and allergic is entirely negative.    Pregnant: None. Nursing: None, Contraception: partner has a vasectomy (currently monogamous relationship, but this fluctuates)       Substance Use   Denies frequent or abuse of alcohol. Occasional cannabis use.    Medical / Surgical History                                                                                                                  Patient Active Problem List   Diagnosis     Hypothyroidism     Abnormal glandular Papanicolaou smear of cervix     Dysphonia       Past Surgical History:   Procedure Laterality Date     C ORAL SURGERY PROCEDURE  1989    impacted bicuspids     C PSYCHIATRIC SERVICE/THERAPY  1993    depression     COLONOSCOPY  2013     COLPOSCOPY,LOOP ELECTRD CERVIX EXCIS  2001    normal 6/19/06     HC BREATH HYDROGEN TEST  3/21/2013    Procedure: HYDROGEN BREATH TEST;  Surgeon: Matt Briceno MD;  Location: [facility]        Social/ Family History                                  [per patient report]                                 1ea,1ea     Living arrangements: lives with her son usually with shared custody (Weekend-Tue), but her partner is living with them during the pandemic and feels safe.    Social Support: therapist, partner, friends, suicide survivor support group.  Access to gun:  "denies    Works x4/week at [employer] and x1/week (Wed) at [employer].    Allergy                                Augmentin [penicillins]; Ondansetron; Tetracycline; Bactrim [sulfamethoxazole w/trimethoprim]; Macrobid [nitrofurantoin]; Metronidazole; and Zofran [methylparaben-ondansetron-propylpar]    Current Medications                                                                                                       Current Outpatient Medications   Medication Sig Dispense Refill     ARIPiprazole (ABILIFY) 5 MG tablet Take 1 tablet (5 mg) by mouth daily 30 tablet 1     levothyroxine (SYNTHROID/LEVOTHROID) 75 MCG tablet Take 1 tablet (75 mcg) by mouth daily 90 tablet 3     Loratadine (CLARITIN PO) Take 1 tablet by mouth.          Mental Status Exam                                                                                   9, 14 cog        Alertness: alert  and oriented  Appearance:  Casually dressed and Well groomed  Behavior/Demeanor: cooperative, pleasant and calm, with good  eye contact   Speech: regular rate and rhythm  Mood :  \"good\"  Affect: full range and appropriate; was congruent to mood; was congruent to content  Thought Process (Associations):  Logical, Linear and Goal directed  Thought process (Rate):  Normal  Thought content:  no overt psychosis, denies suicidal ideation, intent or thoughts and patient does not appear to be responding to internal stimuli  Perception:  Reports none;  Denies depersonalization and derealization  Attention/Concentration:  Normal  Memory:  Immediate recall intact and Short-term memory intact  Language: intact  Fund of Knowledge/Intelligence:  Average  Abstraction:  Normal  Insight:  Good and Fair  Judgment:  Good and Fair  Cognition: (6) does  appear grossly intact; formal cognitive testing was not done    Physical Exam     Motor activity/EPS:  Normal  Gait:  Normal  Psychomotor: normal or unremarkable    Labs and Results      Pertinent findings on review include: Review of " records with relevant information reported in the HPI.  Reviewed pt's past medical record and obtained collateral information.      [place] PRESCRIPTION MONITORING PROGRAM [] was checked today:  not using controlled substances.    PHQ9 Today:  N/A  PHQ 10/14/2016 7/23/2019 10/29/2019   PHQ-9 Total Score 11 11 7   Q9: Thoughts of better off dead/self-harm past 2 weeks Several days More than half the days Not at all       JONATHAN 7 Today: N/A  JONATHAN-7 SCORE 8/1/2017 7/23/2019 10/29/2019   Total Score 7 (mild anxiety) - -   Total Score 7 16 10       Recent Labs   Lab Test 08/09/13  1411 02/20/13  1742   CR 0.64 0.63   GFRESTIMATED >90 >90     Recent Labs   Lab Test 08/09/13  1411 02/20/13  1742   AST 24 21   ALT 26 20   ALKPHOS 41 52       PSYCHOTROPIC DRUG INTERACTIONS:    no.  MANAGEMENT:  N/A    Impression/Assessment      Jessica De Paz is a 44 year old adult  who presents for med management follow up.  Pt appears stable in her mood without anxiety, denies SI, SIB or HI.  Pt notes stability of her mood and resolution of chronic suicidal ideation despite regular recurrence of SI and mood instability prior to menses.  Pt continues to sleep well without any hypomanic symptoms.  Pt has not completed lab due to COVID pandemic, but plans to complete this in near future as pt is due for annual visit.  PTSD symptoms are also optimally managed currently. OK to continue on current medication regimen at this time while monitoring for mood fluctuation as pt feels better in summer.          Diagnosis                                                                    Bipolar disorder II  PTSD    Treatment Recommendation & Plan       Medication Ordered/Consults/Labs/tests Ordered:     Medication: continuecurrent medication regimen  OTC Recommendations: none  Lab Orders:  Already ordered  Referrals: none  Release of Information: none  Future Treatment Considerations: Per symptoms.   Return for Follow Up: in 3 months    -Discussed safety  plan for suicidal thoughts  -Discussed plan for suicidality  -Discussed available emergency services  -Patient agrees with the treatment plan  -Encouraged to continue outpatient therapy to gain more coping mechanism for stress.    Treatment Risk Statement: Discussed with the patient my impressions, as well as recommended studies. I educated patient on the differential diagnosis and prognosis. I discussed with the patient the risks and benefits of medications versus no interventions, including efficacy, dose, possible side effects and length of treatment and the importance of medication compliance.  The patient understands the risks, benefits, adverse effects and alternatives. Agrees to treatment with the capacity to do so. No medical contraindications to treatment. The patient also understands the risks of using street drugs or alcohol. I also discussed the potential metabolic side effects of antipsychotics including weight gain, diabetes and lipid abnormalities, risk of tardive dyskinesia and indicates understanding of this and agrees to regular medical monitoring      CRISIS NUMBERS:   Provided routinely in AVS.      Valerie Mathews CNP,  5/7/2020

## 2020-05-07 NOTE — PATIENT INSTRUCTIONS
-Continue on current medication regimen  -Complete labs at your next annual appointment    Your next appointment is scheduled on 8/14 (Fri) at 9am.    To access your telemedicine visit:     Open a web browser, like [employer], and type [email or web address]     You will see a box asking you to check in to let Valerie Mathews know that you are here.     Type in your name and press Check In. That will let Valerie see you in the virtual waiting room. At your scheduled appointment time, your provider will initiate the visit and connect you.     When your visit is done, you can simply close the browser window.        Please Note:  Ideally, you will connect from a desktop, laptop, or tablet with a WiFi connection. Your computer/tablet must have a camera and microphone. You can use a cell phone, if it has a camera, and if you can connect to WiFi. However, if you connect your phone over a cellular network, it is of lower quality and less reliable      Thank you for coming to the PSYCHIATRY CLINIC.    Lab Testing:  If you had lab testing today and your results are reassuring or normal they will be mailed to you or sent through [employer] within 7 days.   If the lab tests need quick action we will call you with the results.  The phone number we will call with results is # [phone] (home) . If this is not the best number please call our clinic and change the number.    Medication Refills:  If you need any refills please call your pharmacy and they will contact us. Our fax number for refills is [phone]. Please allow three business for refill processing.   If you need to  your refill at a new pharmacy, please contact the new pharmacy directly. The new pharmacy will help you get your medications transferred.     Scheduling:  If you have any concerns about today's visit or wish to schedule another appointment please call our office during normal business hours [phone] (8-5:00 M-F)    Contact Us:  Please call  [phone] during business hours (8-5:00 M-F).  If after clinic hours, or on the weekend, please call [phone].    Financial Assistance: [phone]  [phone]  Central Billing Office, [facility]: [phone]  [phone]  Medical Records: [phone]    MENTAL HEALTH CRISIS NUMBERS:  [facility]:   [facility] Center: [phone]  [facility]ris[facility]idence [facility]: [phone]   [facility]: [phone]   COPE  [place] Mobile Team: [phone] (adults) [phone] (child)     [facility]:   [facility]: [phone]   Walk-in counseling [facility] House: [phone]   Walk-in counseling [facility]: [phone]   Crisis Residence [facility] Residence: [phone]   Urgent Care Adult Mental Health: [phone] Mobile team AND  crisis line    Other Crisis Numbers:   National Suicide Prevention Lifeline: 061-304-DKCW (600-116-0355)  CRISIS TEXT LINE: Text to 996114 for any crisis ; OR www.crisistextline.org   Poison Control Center: 1-113.503.8645  CHILD: [facility] needs assessment team: [phone]   Trans Lifeline: 1-330.275.8572  Alexis Project Lifeline: 1-870.507.7046    For a medical emergency please call 911 or go to the nearest ER.         _____________________________________________    Again thank you for choosing PSYCHIATRY CLINIC and please let us know how we can best partner with you to improve you and your family's health.    You may be receiving a survey regarding this appointment. We would love to have your feedback, both positive and negative. The survey is done by an external company, so your answers are anonymous.

## 2020-06-01 DIAGNOSIS — F31.81 BIPOLAR 2 DISORDER (H): ICD-10-CM

## 2020-06-01 RX ORDER — ARIPIPRAZOLE 5 MG/1
TABLET ORAL
Qty: 30 TABLET | OUTPATIENT
Start: 2020-06-01

## 2020-06-29 ENCOUNTER — TELEPHONE (OUTPATIENT)
Dept: PSYCHIATRY | Facility: CLINIC | Age: 45
End: 2020-06-29

## 2020-06-29 DIAGNOSIS — F31.81 BIPOLAR 2 DISORDER (H): ICD-10-CM

## 2020-06-29 NOTE — TELEPHONE ENCOUNTER
[facility] Call Center    Phone Message    May a detailed message be left on voicemail: yes     Reason for Call: Medication Refill Request    Has the patient contacted the pharmacy for the refill? Yes   Name of medication being requested: abilify  Provider who prescribed the medication: Bisi  Pharmacy:      [id number] - [place], [place] - [address],    Date medication is needed:   Pharmacy is requesting a new rx for the abilify      Action Taken: Message routed to:  Other: nursing pool    Travel Screening: Not Applicable

## 2020-06-30 RX ORDER — ARIPIPRAZOLE 5 MG/1
5 TABLET ORAL DAILY
Qty: 90 TABLET | Refills: 0 | Status: SHIPPED | OUTPATIENT
Start: 2020-06-30 | End: 2020-08-14 | Stop reason: DRUGHIGH

## 2020-06-30 NOTE — TELEPHONE ENCOUNTER
Last seen: 5/7  RTC: 3 months  Non-provider cancel: None  No-show: None  Next appt: 8/14    Incoming refill from pharmacy via telephone     Medication requested:   Disp  Refills  Start  End  ASTRID    ARIPiprazole (ABILIFY) 5 MG tablet  7 tablet  0  6/19/2020   No    Sig - Route: Take 1 tablet (5 mg) by mouth daily     Medication refill approved per refill protocol.    Writer called requesting pharmacy ([phone]) and was informed that they do not dispense this medication. Writer was directed to [facility] in [facility].  Writer e-prescribed 90-day supply to [facility] ([phone]). Qty 90, refills 0.

## 2020-08-14 ENCOUNTER — VIRTUAL VISIT (OUTPATIENT)
Dept: PSYCHIATRY | Facility: CLINIC | Age: 45
End: 2020-08-14
Attending: NURSE PRACTITIONER
Payer: COMMERCIAL

## 2020-08-14 DIAGNOSIS — F32.A FATIGUE DUE TO DEPRESSION: ICD-10-CM

## 2020-08-14 DIAGNOSIS — F43.10 PTSD (POST-TRAUMATIC STRESS DISORDER): ICD-10-CM

## 2020-08-14 DIAGNOSIS — F31.81 BIPOLAR 2 DISORDER (H): Primary | ICD-10-CM

## 2020-08-14 DIAGNOSIS — R53.83 FATIGUE DUE TO DEPRESSION: ICD-10-CM

## 2020-08-14 RX ORDER — ARIPIPRAZOLE 5 MG/1
7.5 TABLET ORAL DAILY
Qty: 45 TABLET | Refills: 1 | Status: SHIPPED | OUTPATIENT
Start: 2020-08-14 | End: 2020-08-31

## 2020-08-14 ASSESSMENT — PAIN SCALES - GENERAL: PAINLEVEL: NO PAIN (0)

## 2020-08-14 NOTE — PATIENT INSTRUCTIONS
-Increase Abilify to 7.5 mg daily for your mood.  Monitor your sleep, hypomania  -Complete labs as you are able    Your next appointment is scheduled on 9/17 (Thu) at 9am.    To access your telemedicine visit:     Open a web browser, like [employer], and type [email or web address]     You will see a box asking you to check in to let Valerie Mathews know that you are here.     Type in your name and press Check In. That will let Valerie see you in the virtual waiting room. At your scheduled appointment time, your provider will initiate the visit and connect you.     When your visit is done, you can simply close the browser window.        Please Note:  Ideally, you will connect from a desktop, laptop, or tablet with a WiFi connection. Your computer/tablet must have a camera and microphone. You can use a cell phone, if it has a camera, and if you can connect to WiFi. However, if you connect your phone over a cellular network, it is of lower quality and less reliable      Thank you for coming to the PSYCHIATRY CLINIC.    Lab Testing:  If you had lab testing today and your results are reassuring or normal they will be mailed to you or sent through [employer] within 7 days. If the lab tests need quick action we will call you with the results. The phone number we will call with results is # [phone] (home) . If this is not the best number please call our clinic and change the number.    Medication Refills:  If you need any refills please call your pharmacy and they will contact us. Our fax number for refills is [phone]. Please allow three business for refill processing. If you need to  your refill at a new pharmacy, please contact the new pharmacy directly. The new pharmacy will help you get your medications transferred.     Scheduling:  If you have any concerns about today's visit or wish to schedule another appointment please call our office during normal business hours [phone] (8-5:00 M-F)    Contact  Us:  Please call [phone] during business hours (8-5:00 M-F).  If after clinic hours, or on the weekend, please call  [phone].    Financial Assistance [phone]  [facility] Billing [phone]  [place] Billing Office, [facility]: [phone]  [place] Billing [phone]  Medical Records [phone]      MENTAL HEALTH CRISIS NUMBERS:  For a medical emergency please call  911 or go to the nearest ER.     [facility]:   [facility] -[phone]   Crisis Residence [facility] Residence -[phone]   Walk-In Counseling Center [facility] -[phone]   COPE 24/7 [place] Mobile Team -[phone] (adults)/[phone] (child)  [facility] - [phone]      [facility]:   [facility] - [phone]   Walk-in counseling [facility] - [phone]   Walk-in counseling [facility] - [phone]   Crisis Residence [facility] Residence - [phone]  Urgent Care Adult Mental Nuovfd-[phone] mobile unit/ 24/7 crisis line    National Crisis Numbers:   National Suicide Prevention Lifeline: 1-146-273-TALK (115-467-8535)  Poison Control Center - 1-628.907.5416  Exploredge/resources for a list of additional resources (SOS)  Trans Lifeline a hotline for transgender people 1-255.101.3353  The Alexis Project a hotline for LGBT youth 1-324.722.8332  Crisis Text Line: For any crisis 24/7   To: 737475  see www.crisistextline.org  - IF MAKING A CALL FEELS TOO HARD, send a text!         Again thank you for choosing PSYCHIATRY CLINIC and please let us know how we can best partner with you to improve you and your family's health.    You may be receiving a survey regarding this appointment. We would love to have your feedback, both positive and negative. The survey is done by an external company, so your answers are anonymous.
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
## 2020-08-20 DIAGNOSIS — F32.A FATIGUE DUE TO DEPRESSION: ICD-10-CM

## 2020-08-20 DIAGNOSIS — Z79.899 MEDICATION MANAGEMENT: ICD-10-CM

## 2020-08-20 DIAGNOSIS — R53.83 FATIGUE DUE TO DEPRESSION: ICD-10-CM

## 2020-08-20 LAB
ALBUMIN SERPL-MCNC: 3.7 G/DL (ref 3.4–5)
ALP SERPL-CCNC: 39 U/L (ref 40–150)
ALT SERPL W P-5'-P-CCNC: 11 U/L (ref 0–50)
ANION GAP SERPL CALCULATED.3IONS-SCNC: 6 MMOL/L (ref 3–14)
AST SERPL W P-5'-P-CCNC: 11 U/L (ref 0–45)
BILIRUB SERPL-MCNC: 0.4 MG/DL (ref 0.2–1.3)
BUN SERPL-MCNC: 11 MG/DL (ref 7–30)
CALCIUM SERPL-MCNC: 8.3 MG/DL (ref 8.5–10.1)
CHLORIDE SERPL-SCNC: 108 MMOL/L (ref 94–109)
CHOLEST SERPL-MCNC: 177 MG/DL
CO2 SERPL-SCNC: 24 MMOL/L (ref 20–32)
CREAT SERPL-MCNC: 0.62 MG/DL (ref 0.52–1.04)
ERYTHROCYTE [DISTWIDTH] IN BLOOD BY AUTOMATED COUNT: 11.3 % (ref 10–15)
GFR SERPL CREATININE-BSD FRML MDRD: >90 ML/MIN/{1.73_M2}
GLUCOSE SERPL-MCNC: 83 MG/DL (ref 70–99)
HBA1C MFR BLD: 4.9 % (ref 0–5.6)
HCT VFR BLD AUTO: 41.1 % (ref 35–47)
HDLC SERPL-MCNC: 62 MG/DL
HGB BLD-MCNC: 13.7 G/DL (ref 11.7–15.7)
LDLC SERPL CALC-MCNC: 104 MG/DL
MCH RBC QN AUTO: 32.6 PG (ref 26.5–33)
MCHC RBC AUTO-ENTMCNC: 33.3 G/DL (ref 31.5–36.5)
MCV RBC AUTO: 98 FL (ref 78–100)
NONHDLC SERPL-MCNC: 115 MG/DL
PLATELET # BLD AUTO: 169 10E9/L (ref 150–450)
POTASSIUM SERPL-SCNC: 4.3 MMOL/L (ref 3.4–5.3)
PROT SERPL-MCNC: 7.3 G/DL (ref 6.8–8.8)
RBC # BLD AUTO: 4.2 10E12/L (ref 3.8–5.2)
SODIUM SERPL-SCNC: 138 MMOL/L (ref 133–144)
T4 FREE SERPL-MCNC: 0.94 NG/DL (ref 0.76–1.46)
TRIGL SERPL-MCNC: 53 MG/DL
TSH SERPL DL<=0.005 MIU/L-ACNC: 5.28 MU/L (ref 0.4–4)
WBC # BLD AUTO: 3.9 10E9/L (ref 4–11)

## 2020-08-20 PROCEDURE — 84443 ASSAY THYROID STIM HORMONE: CPT | Performed by: FAMILY MEDICINE

## 2020-08-20 PROCEDURE — 80053 COMPREHEN METABOLIC PANEL: CPT | Performed by: FAMILY MEDICINE

## 2020-08-20 PROCEDURE — 83036 HEMOGLOBIN GLYCOSYLATED A1C: CPT | Performed by: FAMILY MEDICINE

## 2020-08-20 PROCEDURE — 85027 COMPLETE CBC AUTOMATED: CPT | Performed by: FAMILY MEDICINE

## 2020-08-20 PROCEDURE — 36415 COLL VENOUS BLD VENIPUNCTURE: CPT | Performed by: FAMILY MEDICINE

## 2020-08-20 PROCEDURE — 84439 ASSAY OF FREE THYROXINE: CPT | Performed by: FAMILY MEDICINE

## 2020-08-20 PROCEDURE — 80061 LIPID PANEL: CPT | Performed by: FAMILY MEDICINE

## 2020-08-31 ENCOUNTER — TELEPHONE (OUTPATIENT)
Dept: PSYCHIATRY | Facility: CLINIC | Age: 45
End: 2020-08-31

## 2020-08-31 DIAGNOSIS — F31.81 BIPOLAR 2 DISORDER (H): Primary | ICD-10-CM

## 2020-08-31 RX ORDER — ARIPIPRAZOLE 15 MG/1
7.5 TABLET ORAL DAILY
Qty: 45 TABLET | Refills: 0 | Status: SHIPPED | OUTPATIENT
Start: 2020-08-31 | End: 2020-09-17

## 2020-08-31 NOTE — TELEPHONE ENCOUNTER
Writer received call from [facility]  ([phone], opt 1), reporting that pt is requesting a refill of Abilify and the order they have is for 5 mg daily, but pt is requesting 15 mg tablets.  Writer left voice mail message for pt requesting callback to clarify pharmacy request.    Per chart on 8/14 order for one and one-half 5 mg tablets was sent to [facility].  This was likely not covered by pt's insurance and the pharmacy might have substituted one-half 15 mg tablets.      Muhumed, Yasmin Snyder, David J, RN               Donnie,     I got a call from [facility] saying that the prescription was sent to the drug [facility] when it was suppose to be sent to them. The pharmacist asked that someone give them a call to resend it.     Call : [phone]   Order # [id number]     Also, they are looking for a 90 day supply.       Thank you,     Portia Prieto e-prescribed 90-day supply to [facility] ([phone]). Qty 45, refills 0.

## 2020-09-17 ENCOUNTER — TELEPHONE (OUTPATIENT)
Dept: PSYCHIATRY | Facility: CLINIC | Age: 45
End: 2020-09-17

## 2020-09-17 ENCOUNTER — VIRTUAL VISIT (OUTPATIENT)
Dept: PSYCHIATRY | Facility: CLINIC | Age: 45
End: 2020-09-17
Attending: NURSE PRACTITIONER
Payer: COMMERCIAL

## 2020-09-17 DIAGNOSIS — F31.81 BIPOLAR 2 DISORDER (H): Primary | ICD-10-CM

## 2020-09-17 DIAGNOSIS — F43.10 PTSD (POST-TRAUMATIC STRESS DISORDER): ICD-10-CM

## 2020-09-17 NOTE — PATIENT INSTRUCTIONS
-Discontinued Abilify since you are not taking it.  Please monitor closely on your mood and sleep  -May start SAD light 15 mins daily in AM for seasonal depression.  [email or web address]-reg-happylight-lucent-trade-light-therapy-[email or web address]?[email or web address]&enginename=FishBrain&mcid=[email or web address]_nonbrand_[email or web address]&product_id=[id number]&adtype=korey&product_channel=online&adpos=&tbfwnzcp=[id number]&device=c&matchtype=&network=g&utm_campaignid=[id number]&utm_adgroupid=[id number]&afewgvzl=[id number]&gclid=[email or web address]&gclsrc=[email or web address].ds    [email or web address]    Your next appointment is scheduled on 10/2 (Fri) at 10am.    To access your telemedicine visit:     Open a web browser, like [facility], and type [email or web address]     You will see a box asking you to check in to let Valerie Mathews know that you are here.     Type in your name and press Check In. That will let Valerie see you in the virtual waiting room. At your scheduled appointment time, your provider will initiate the visit and connect you.     When your visit is done, you can simply close the browser window.        Please Note:  Ideally, you will connect from a desktop, laptop, or tablet with a WiFi connection. Your computer/tablet must have a camera and microphone. You can use a cell phone, if it has a camera, and if you can connect to WiFi. However, if you connect your phone over a cellular network, it is of lower quality and less reliable.    Thank you for coming to the PSYCHIATRY CLINIC.    Lab Testing:  If you had lab testing today and your results are reassuring or normal they will be mailed to you or sent through CIRQ[facility] within 7 days. If the lab tests need quick action we will call you with the results. The phone number we will call with results is # [phone] (home) . If this  is not the best number please call our clinic and change the number.    Medication Refills:  If you need any refills please call your pharmacy and they will contact us. Our fax number for refills is [phone]. Please allow three business for refill processing. If you need to  your refill at a new pharmacy, please contact the new pharmacy directly. The new pharmacy will help you get your medications transferred.     Scheduling:  If you have any concerns about today's visit or wish to schedule another appointment please call our office during normal business hours [phone] (8-5:00 M-F)    Contact Us:  Please call [phone] during business hours (8-5:00 M-F).  If after clinic hours, or on the weekend, please call  [phone].    Financial Assistance [phone]  [facility] Billing [phone]  Central Billing Office, [facility]: [phone]  [facility] Billing [phone]  Medical Records [phone]      MENTAL HEALTH CRISIS NUMBERS:  For a medical emergency please call  911 or go to the nearest ER.     [facility]:   [facility] -[phone]   Crisis Residence [facility] -[phone]   Walk-In Counseling [facility] -[phone]   COPE 24/7 [place] Mobile Team -[phone] (adults)/[phone] (child)  CHILD: [facility] needs assessment team - [phone]      [facility]:   [facility] - [phone]   Walk-in counseling [facility] - [phone]   Walk-in counseling [facility] - [phone]   Crisis Residence [facility] Residence - [phone]  Urgent Care Adult Mental Mgmmdm-[phone] mobile unit/ 24/7 crisis line    National Crisis Numbers:   National Suicide Prevention Lifeline: 7-349-811-TALK (242-715-4935)  Poison Control Center - 1-835.290.7486  [employer]/resources for a list of additional resources (SOS)  Trans Lifeline a hotline for transgender people 1-855.181.4135  The  [employer] a hotline for LGBT youth 1-423-295-9642  Crisis Text Line: For any crisis 24/7   To: 335120  see www.crisistextline.org  - IF MAKING A CALL FEELS TOO HARD, send a text!         Again thank you for choosing PSYCHIATRY CLINIC and please let us know how we can best partner with you to improve you and your family's health.    You may be receiving a survey regarding this appointment. We would love to have your feedback, both positive and negative. The survey is done by an external company, so your answers are anonymous.

## 2020-09-17 NOTE — TELEPHONE ENCOUNTER
On 9/17/2020, at 0951 and 10:14, writer called patient at mobile to confirm Virtual Visit. Writer unable to make contact with patient. Writer left detailed voice message for callback. [phone], left as call back number. IHSAN Montgomery, EMT
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
## 2020-10-02 ENCOUNTER — VIRTUAL VISIT (OUTPATIENT)
Dept: PSYCHIATRY | Facility: CLINIC | Age: 45
End: 2020-10-02
Attending: NURSE PRACTITIONER
Payer: COMMERCIAL

## 2020-10-02 ENCOUNTER — TELEPHONE (OUTPATIENT)
Dept: PSYCHIATRY | Facility: CLINIC | Age: 45
End: 2020-10-02

## 2020-10-02 DIAGNOSIS — F31.81 BIPOLAR 2 DISORDER (H): Primary | ICD-10-CM

## 2020-10-02 DIAGNOSIS — F43.10 PTSD (POST-TRAUMATIC STRESS DISORDER): ICD-10-CM

## 2020-10-02 PROCEDURE — 99214 OFFICE O/P EST MOD 30 MIN: CPT | Mod: 95 | Performed by: NURSE PRACTITIONER
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
## 2020-10-02 NOTE — PATIENT INSTRUCTIONS
-Continue to monitor your mood, anxiety and sleep.  If you are noting exacerbation of symptoms, pls let Valerie know or see me sooner.    Your next appointment is scheduled on 11/12 (Thu) at 9am.    To access your telemedicine visit:     Open a web browser, like [employer], and type [email or web address]     You will see a box asking you to check in to let Valeriereagan Mathews know that you are here.     Type in your name and press Check In. That will let Valerie see you in the virtual waiting room. At your scheduled appointment time, your provider will initiate the visit and connect you.     When your visit is done, you can simply close the browser window.        Please Note:  Ideally, you will connect from a desktop, laptop, or tablet with a WiFi connection. Your computer/tablet must have a camera and microphone. You can use a cell phone, if it has a camera, and if you can connect to WiFi. However, if you connect your phone over a cellular network, it is of lower quality and less reliable

## 2020-10-02 NOTE — TELEPHONE ENCOUNTER
On 10/2/2020, at 0949, writer called patient at mobile to confirm Virtual Visit. Writer unable to make contact with patient. Writer left detailed voice message for callback. [phone], left as call back number. IHSAN Montgomery, EMT

## 2020-11-06 ENCOUNTER — OFFICE VISIT (OUTPATIENT)
Dept: FAMILY MEDICINE | Facility: CLINIC | Age: 45
End: 2020-11-06
Payer: COMMERCIAL

## 2020-11-06 VITALS
DIASTOLIC BLOOD PRESSURE: 67 MMHG | BODY MASS INDEX: 22.5 KG/M2 | SYSTOLIC BLOOD PRESSURE: 120 MMHG | HEART RATE: 66 BPM | OXYGEN SATURATION: 99 % | WEIGHT: 127 LBS

## 2020-11-06 DIAGNOSIS — R53.83 FATIGUE, UNSPECIFIED TYPE: ICD-10-CM

## 2020-11-06 DIAGNOSIS — Z00.00 HEALTH CARE MAINTENANCE: ICD-10-CM

## 2020-11-06 DIAGNOSIS — R53.83 FATIGUE, UNSPECIFIED TYPE: Primary | ICD-10-CM

## 2020-11-06 LAB
ALBUMIN SERPL-MCNC: 3.9 G/DL (ref 3.4–5)
ALP SERPL-CCNC: 49 U/L (ref 40–150)
ALT SERPL W P-5'-P-CCNC: 13 U/L (ref 0–50)
ANION GAP SERPL CALCULATED.3IONS-SCNC: 2 MMOL/L (ref 3–14)
AST SERPL W P-5'-P-CCNC: 9 U/L (ref 0–45)
BASOPHILS # BLD AUTO: 0 10E9/L (ref 0–0.2)
BASOPHILS NFR BLD AUTO: 0.5 %
BILIRUB SERPL-MCNC: 0.8 MG/DL (ref 0.2–1.3)
BUN SERPL-MCNC: 9 MG/DL (ref 7–30)
CALCIUM SERPL-MCNC: 9.1 MG/DL (ref 8.5–10.1)
CHLORIDE SERPL-SCNC: 108 MMOL/L (ref 94–109)
CO2 SERPL-SCNC: 29 MMOL/L (ref 20–32)
CREAT SERPL-MCNC: 0.66 MG/DL (ref 0.52–1.04)
DIFFERENTIAL METHOD BLD: NORMAL
EOSINOPHIL # BLD AUTO: 0 10E9/L (ref 0–0.7)
EOSINOPHIL NFR BLD AUTO: 0.7 %
ERYTHROCYTE [DISTWIDTH] IN BLOOD BY AUTOMATED COUNT: 11.3 % (ref 10–15)
GFR SERPL CREATININE-BSD FRML MDRD: >90 ML/MIN/{1.73_M2}
GLUCOSE SERPL-MCNC: 84 MG/DL (ref 70–99)
HCT VFR BLD AUTO: 41.9 % (ref 35–47)
HGB BLD-MCNC: 13.7 G/DL (ref 11.7–15.7)
IMM GRANULOCYTES # BLD: 0 10E9/L (ref 0–0.4)
IMM GRANULOCYTES NFR BLD: 0.3 %
LYMPHOCYTES # BLD AUTO: 1.3 10E9/L (ref 0.8–5.3)
LYMPHOCYTES NFR BLD AUTO: 22.1 %
MCH RBC QN AUTO: 31.8 PG (ref 26.5–33)
MCHC RBC AUTO-ENTMCNC: 32.7 G/DL (ref 31.5–36.5)
MCV RBC AUTO: 97 FL (ref 78–100)
MONOCYTES # BLD AUTO: 0.6 10E9/L (ref 0–1.3)
MONOCYTES NFR BLD AUTO: 9.3 %
NEUTROPHILS # BLD AUTO: 4 10E9/L (ref 1.6–8.3)
NEUTROPHILS NFR BLD AUTO: 67.1 %
NRBC # BLD AUTO: 0 10*3/UL
NRBC BLD AUTO-RTO: 0 /100
PLATELET # BLD AUTO: 168 10E9/L (ref 150–450)
POTASSIUM SERPL-SCNC: 4.4 MMOL/L (ref 3.4–5.3)
PROT SERPL-MCNC: 7.6 G/DL (ref 6.8–8.8)
RBC # BLD AUTO: 4.31 10E12/L (ref 3.8–5.2)
SODIUM SERPL-SCNC: 138 MMOL/L (ref 133–144)
TSH SERPL DL<=0.005 MIU/L-ACNC: 3.62 MU/L (ref 0.4–4)
WBC # BLD AUTO: 5.9 10E9/L (ref 4–11)

## 2020-11-06 PROCEDURE — 36415 COLL VENOUS BLD VENIPUNCTURE: CPT | Performed by: PATHOLOGY

## 2020-11-06 PROCEDURE — 80050 GENERAL HEALTH PANEL: CPT | Performed by: PATHOLOGY

## 2020-11-06 PROCEDURE — 99213 OFFICE O/P EST LOW 20 MIN: CPT | Performed by: FAMILY MEDICINE

## 2020-11-06 ASSESSMENT — PAIN SCALES - GENERAL: PAINLEVEL: NO PAIN (0)

## 2020-11-06 NOTE — PROGRESS NOTES
SUBJECTIVE:    Pt is a 45 year old female with pmh of     Patient Active Problem List   Diagnosis     Hypothyroidism     Abnormal glandular Papanicolaou smear of cervix     Dysphonia       who is here for evaluation of had concerns including Recheck Medication (pt here to discuss thyroid).    Here for a f/u thyroid  Feels cold, tired  Not on psychiatry meds now that would impact metabolism/thyroid    Past Medical History:   Diagnosis Date     Abnormal glandular Papanicolaou smear of cervix 2001    LEEP     Allergic rhinitis      Anxiety 2000     Depressive disorder 1992     Depressive disorder, not elsewhere classified     hx of as teen (hosp 1993)     Eating disorder, unspecified     Hx of      Hoarseness a few years     Intestinal bacterial overgrowth      Unspecified hypothyroidism     Borderline- TSH 5.20 6/19/06. On levoxyl 50 mcg 44/05     Past Surgical History:   Procedure Laterality Date     C ORAL SURGERY PROCEDURE  1989    impacted bicuspids     C PSYCHIATRIC SERVICE/THERAPY  1993    depression     COLONOSCOPY  2013     COLPOSCOPY,LOOP ELECTRD CERVIX EXCIS  2001    normal 6/19/06     HC BREATH HYDROGEN TEST  3/21/2013    Procedure: HYDROGEN BREATH TEST;  Surgeon: Matt Briceno MD;  [facility]     Current Outpatient Medications   Medication     levothyroxine (SYNTHROID/LEVOTHROID) 75 MCG tablet     Loratadine (CLARITIN PO)     No current facility-administered medications for this visit.      Allergies   Allergen Reactions     Augmentin [Penicillins] Rash     Ondansetron Other (See Comments)     Tetracycline Rash     Bactrim [Sulfamethoxazole W/Trimethoprim] Rash     Macrobid [Nitrofurantoin] Anxiety     Metronidazole Anxiety and Nausea and Vomiting     Zofran [Methylparaben-Ondansetron-Propylpar] Anxiety           Current Outpatient Medications   Medication Sig Dispense Refill     levothyroxine (SYNTHROID/LEVOTHROID) 75 MCG tablet Take 1 tablet (75 mcg) by mouth daily 90 tablet 3     Loratadine  (CLARITIN PO) Take 1 tablet by mouth.         Social History     Tobacco Use     Smoking status: Never Smoker     Smokeless tobacco: Never Used   Substance Use Topics     Alcohol use: Yes     Alcohol/week: 0.8 standard drinks     Comment: Rarely (less than once per month), 1 drink on average     Drug use: No           OBJECTIVE:  /67 (BP Location: Right arm, Patient Position: Sitting, Cuff Size: Adult Regular)   Pulse 66   Wt 57.6 kg (127 lb)   SpO2 99%   BMI 22.50 kg/m    GENERAL APPEARANCE: Alert, no acute distress  NEURO: Alert, oriented, speech and mentation normal  PSYCHE: mentation appears normal, affect and mood normal    ASSESSMENT/PLAN:    Hypothyroid: labs  Fatigue: labs, cont w/ psychiatry  HCM: up to date pap via c, shots are up to date, mammogram ordered    LAKISHA HE MD

## 2020-11-06 NOTE — PATIENT INSTRUCTIONS
Primary Care Center Medication Refill Request Information:  * Please contact your pharmacy regarding ANY request for medication refills.  ** [facility] Prescription Fax = [phone]  * Please allow 3 business days for routine medication refills.  * Please allow 5 business days for controlled substance medication refills.     Primary Care Center Test Result notification information:  *You will be notified with in 7-10 days of your appointment day regarding the results of your test.  If you are on MyChart you will be notified as soon as the provider has reviewed the results and signed off on them.    Primary Care Center: [phone]     Breast Center ([facility]) [phone] (2nd Floor [facility]) [facility]  Breast Center ([facility]) [phone] ([address])

## 2020-11-06 NOTE — NURSING NOTE
Chief Complaint   Patient presents with     Recheck Medication     pt here to discuss thyroid       Tee Bledsoe CMA, EMT at 9:31 AM on 11/6/2020.

## 2020-11-12 ENCOUNTER — VIRTUAL VISIT (OUTPATIENT)
Dept: PSYCHIATRY | Facility: CLINIC | Age: 45
End: 2020-11-12
Attending: NURSE PRACTITIONER
Payer: COMMERCIAL

## 2020-11-12 DIAGNOSIS — F31.81 BIPOLAR 2 DISORDER (H): ICD-10-CM

## 2020-11-12 DIAGNOSIS — F41.9 ANXIETY: ICD-10-CM

## 2020-11-12 DIAGNOSIS — F43.10 PTSD (POST-TRAUMATIC STRESS DISORDER): Primary | ICD-10-CM

## 2020-11-12 PROCEDURE — 99214 OFFICE O/P EST MOD 30 MIN: CPT | Mod: 95 | Performed by: NURSE PRACTITIONER

## 2020-11-12 RX ORDER — HYDROXYZINE PAMOATE 25 MG/1
25-50 CAPSULE ORAL 4 TIMES DAILY PRN
Qty: 60 CAPSULE | Refills: 1 | Status: SHIPPED | OUTPATIENT
Start: 2020-11-12 | End: 2023-06-09

## 2020-11-12 ASSESSMENT — PAIN SCALES - GENERAL: PAINLEVEL: NO PAIN (0)

## 2020-11-12 NOTE — PATIENT INSTRUCTIONS
-May take Hydroxyzine 25-50 mg every 6 hours as needed for anxiety and sleep    Your next appointment is scheduled on 12/10 (Thu) at 9am.    To access your telemedicine visit:     Open a web browser, like C[employer]on [employer], and type [email or web address]     You will see a box asking you to check in to let Valerie Mathews know that you are here.     Type in your name and press Check In. That will let Valerie see you in the virtual waiting room. At your scheduled appointment time, your provider will initiate the visit and connect you.     When your visit is done, you can simply close the browser window.        Please Note:  Ideally, you will connect from a desktop, laptop, or tablet with a WiFi connection. Your computer/tablet must have a camera and microphone. You can use a cell phone, if it has a camera, and if you can connect to WiFi. However, if you connect your phone over a cellular network, it is of lower quality and less reliable      Thank you for coming to the [facility] MENTAL HEALTH & ADDICTION [place] CLINIC.    Lab Testing:  If you had lab testing today and your results are reassuring or normal they will be mailed to you or sent through Relevvant within 7 days. If the lab tests need quick action we will call you with the results. The phone number we will call with results is # [phone] (home) . If this is not the best number please call our clinic and change the number.    Medication Refills:  If you need any refills please call your pharmacy and they will contact us. Our fax number for refills is [phone]. Please allow three business for refill processing. If you need to  your refill at a new pharmacy, please contact the new pharmacy directly. The new pharmacy will help you get your medications transferred.     Scheduling:  If you have any concerns about today's visit or wish to schedule another appointment please call our office during normal business hours [phone] (8-5:00  M-F)    Contact Us:  Please call [phone] during business hours (8-5:00 M-F).  If after clinic hours, or on the weekend, please call  [phone].    Financial Assistance [phone]  [facility] Billing [phone]  Central Billing Office, [facility]: [phone]  [place] Billing [phone]  Medical Records [phone]  [place] Patient Bill of Rights [email or web address]       MENTAL HEALTH CRISIS NUMBERS:  For a medical emergency please call  911 or go to the nearest ER.     [facility]:   [facility] -[phone]   Crisis Residence [facility] Residence -[phone]   Walk-In Counseling [facility] -[phone]   COPE 24/7 [place] Mobile Team -[phone] (adults)/[phone] (child)  CHILD: [facility] needs assessment team - [phone]      [facility]:   [facility] - [phone]   Walk-in counseling [facility] - [phone]   Walk-in counseling [facility] - [phone]   Crisis Residence [facility] - [phone]  Urgent Care Adult Mental Djjmet-[phone] mobile unit/ 24/7 crisis line    National Crisis Numbers:   National Suicide Prevention Lifeline: 7-397-666-TALK (950-270-1026)  Poison Control Center - 8-426-496-0794  [place]/resources for a list of additional resources (SOS)  Trans Lifeline a hotline for transgender people 1-319.383.4962  The Alexis Project a hotline for LGBT youth 1-373.402.2693  Crisis Text Line: For any crisis 24/7   To: 051825  see www.crisistextline.org  - IF MAKING A CALL FEELS TOO HARD, send a text!         Again thank you for choosing [facility] MENTAL HEALTH & ADDICTION [facility] and please let us know how we can best partner with you to improve you and your family's health.    You may be receiving a survey regarding this appointment. We would love to have your feedback,  both positive and negative. The survey is done by an external company, so your answers are anonymous.
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
## 2020-11-29 ENCOUNTER — HEALTH MAINTENANCE LETTER (OUTPATIENT)
Age: 45
End: 2020-11-29

## 2020-12-10 ENCOUNTER — VIRTUAL VISIT (OUTPATIENT)
Dept: PSYCHIATRY | Facility: CLINIC | Age: 45
End: 2020-12-10
Attending: NURSE PRACTITIONER
Payer: COMMERCIAL

## 2020-12-10 DIAGNOSIS — F31.81 BIPOLAR 2 DISORDER (H): Primary | ICD-10-CM

## 2020-12-10 DIAGNOSIS — F43.10 PTSD (POST-TRAUMATIC STRESS DISORDER): ICD-10-CM

## 2020-12-10 PROCEDURE — 99213 OFFICE O/P EST LOW 20 MIN: CPT | Mod: 95 | Performed by: NURSE PRACTITIONER

## 2020-12-10 ASSESSMENT — PAIN SCALES - GENERAL: PAINLEVEL: NO PAIN (0)

## 2020-12-10 NOTE — PATIENT INSTRUCTIONS
-Continue on current medication regimen.    Your next appointment is scheduled on 1/7/2021 (Thu) at 10:30am.    To access your telemedicine visit:     Open a web browser, like Cellay, and type [email or web address]     You will see a box asking you to check in to let Valerie Mathews know that you are here.     Type in your name and press Check In. That will let Valerie see you in the virtual waiting room. At your scheduled appointment time, your provider will initiate the visit and connect you.     When your visit is done, you can simply close the browser window.        Please Note:  Ideally, you will connect from a desktop, laptop, or tablet with a WiFi connection. Your computer/tablet must have a camera and microphone. You can use a cell phone, if it has a camera, and if you can connect to WiFi. However, if you connect your phone over a cellular network, it is of lower quality and less reliable      Thank you for coming to the [facility] MENTAL HEALTH & ADDICTION [place] CLINIC.    Lab Testing:  If you had lab testing today and your results are reassuring or normal they will be mailed to you or sent through Genemation within 7 days. If the lab tests need quick action we will call you with the results. The phone number we will call with results is # [phone] (home) . If this is not the best number please call our clinic and change the number.    Medication Refills:  If you need any refills please call your pharmacy and they will contact us. Our fax number for refills is [phone]. Please allow three business for refill processing. If you need to  your refill at a new pharmacy, please contact the new pharmacy directly. The new pharmacy will help you get your medications transferred.     Scheduling:  If you have any concerns about today's visit or wish to schedule another appointment please call our office during normal business hours [phone] (8-5:00 M-F)    Contact Us:  Please call  [phone] during business hours (8-5:00 M-F).  If after clinic hours, or on the weekend, please call  [phone].    Financial Assistance [phone]  [facility] Billing [phone]  Central Billing Office, [facility]: [phone]  [place] Billing [phone]  Medical Records [phone]  [place] Patient Bill of Rights [email or web address]       MENTAL HEALTH CRISIS NUMBERS:  For a medical emergency please call  911 or go to the nearest ER.     [facility]:   [facility] -[phone]   Crisis Residence [facility] Residence -[phone]   Walk-In Counseling [facility] -[phone]   COPE 24/7 [place] Mobile Team -[phone] (adults)/[phone] (child)  CHILD: [facility] needs assessment team - [phone]      [facility]:   [facility] - [phone]   Walk-in counseling [facility] - [phone]   Walk-in counseling [facility] - [phone]   Crisis Residence [facility] Residence - [phone]  Urgent Care Adult Mental Swkenm-[phone] mobile unit/ 24/7 crisis line    National Crisis Numbers:   National Suicide Prevention Lifeline: 2-810-941-TALK (276-023-9401)  Poison Control Center - 6-066-710-5330  iSyndica.LinkCloud/resources for a list of additional resources (SOS)  Trans Lifeline a hotline for transgender people 1-898.757.8916  The Alexis Project a hotline for LGBT youth 1-348.742.1592  Crisis Text Line: For any crisis 24/7   To: 374738  see www.crisistextline.org  - IF MAKING A CALL FEELS TOO HARD, send a text!         Again thank you for choosing [facility] MENTAL HEALTH & ADDICTION [facility] and please let us know how we can best partner with you to improve you and your family's health.    You may be receiving a survey regarding this appointment. We would love to have your feedback, both positive and negative. The  survey is done by an external company, so your answers are anonymous.
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
## 2021-01-07 ENCOUNTER — VIRTUAL VISIT (OUTPATIENT)
Dept: PSYCHIATRY | Facility: CLINIC | Age: 46
End: 2021-01-07
Attending: NURSE PRACTITIONER
Payer: COMMERCIAL

## 2021-01-07 DIAGNOSIS — F31.81 BIPOLAR 2 DISORDER (H): ICD-10-CM

## 2021-01-07 DIAGNOSIS — F43.10 PTSD (POST-TRAUMATIC STRESS DISORDER): Primary | ICD-10-CM

## 2021-01-07 PROCEDURE — 99213 OFFICE O/P EST LOW 20 MIN: CPT | Mod: 95 | Performed by: NURSE PRACTITIONER

## 2021-01-07 ASSESSMENT — PAIN SCALES - GENERAL: PAINLEVEL: NO PAIN (0)
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
## 2021-01-07 NOTE — PATIENT INSTRUCTIONS
-Continue on current medication regimen    Your next appointment is scheduled on 3/9/2021 (Tue) 9am.    To access your telemedicine visit:     Open a web browser, like [employer], and type [email or web address]     You will see a box asking you to check in to let Valerie Mathews know that you are here.     Type in your name and press Check In. That will let Valerie see you in the virtual waiting room. At your scheduled appointment time, your provider will initiate the visit and connect you.     When your visit is done, you can simply close the browser window.        Please Note:  Ideally, you will connect from a desktop, laptop, or tablet with a WiFi connection. Your computer/tablet must have a camera and microphone. You can use a cell phone, if it has a camera, and if you can connect to WiFi. However, if you connect your phone over a cellular network, it is of lower quality and less reliable      Thank you for coming to the [facility] [place] [facility].    Lab Testing:  If you had lab testing today and your results are reassuring or normal they will be mailed to you or sent through Takes within 7 days. If the lab tests need quick action we will call you with the results. The phone number we will call with results is # [phone] (home) . If this is not the best number please call our clinic and change the number.    Medication Refills:  If you need any refills please call your pharmacy and they will contact us. Our fax number for refills is [phone]. Please allow three business for refill processing. If you need to  your refill at a new pharmacy, please contact the new pharmacy directly. The new pharmacy will help you get your medications transferred.     Scheduling:  If you have any concerns about today's visit or wish to schedule another appointment please call our office during normal business hours [phone] (8-5:00 M-F)    Contact Us:  Please call [phone]  during business hours (8-5:00 M-F).  If after clinic hours, or on the weekend, please call  [phone].    Financial Assistance [phone]  [facility] Billing [phone]  Central Billing Office, [facility]: [phone]  [facility] Billing [phone]  Medical Records [phone]  [facility] Patient Bill of Rights [email or web address]       MENTAL HEALTH CRISIS NUMBERS:  For a medical emergency please call  911 or go to the nearest ER.     [facility]:   [facility] -[phone]   Crisis Residence [facility] -[phone]   Walk-In Counseling [facility] -[phone]   COPE 24/7 [place] Mobile Team -[phone] (adults)/[phone] (child)  CHILD: [facility] needs assessment team - [phone]      [facility]:   [facility] - [phone]   Walk-in counseling [facility] - [phone]   Walk-in counseling [facility] - [phone]   Crisis Residence [facility] Residence - [phone]  Urgent Care Adult Mental [facility]-[phone] mobile unit/ 24/7 crisis line    National Crisis Numbers:   National Suicide Prevention Lifeline: 6-651-023-TALK (291-389-2778)  Poison Control Center - 7-631-075-1082  Yamli.Beibamboo/resources for a list of additional resources (SOS)  Trans Lifeline a hotline for transgender people 1-524.620.4740  The [employer] Project a hotline for LGBT youth 1-475.857.6795  Crisis Text Line: For any crisis 24/7   To: 333340  see www.crisistextline.org  - IF MAKING A CALL FEELS TOO HARD, send a text!         Again thank you for choosing [facility] MENTAL HEALTH & ADDICTION [facility] and please let us know how we can best partner with you to improve you and your family's health.    You may be receiving a survey regarding this appointment. We would love to have your feedback, both positive and negative. The survey is done by  an external company, so your answers are anonymous.

## 2021-04-02 ENCOUNTER — MYC MEDICAL ADVICE (OUTPATIENT)
Dept: FAMILY MEDICINE | Facility: CLINIC | Age: 46
End: 2021-04-02

## 2021-04-02 ENCOUNTER — TELEPHONE (OUTPATIENT)
Dept: OBGYN | Facility: CLINIC | Age: 46
End: 2021-04-02

## 2021-04-02 NOTE — TELEPHONE ENCOUNTER
----- Message from Germán Gonzalez sent at 4/2/2021  1:50 PM CDT -----  Regarding: Medication Refill  Good Afternoon,    [facility] pharmacy calling to see if they could get a new prescription sent to their pharmacy for the levothyroxine (SYNTHROID/LEVOTHROID) 75 MCG tablet . Prescribed by Isi Bartlett. Pharmacy is in pt's chart! Thanks

## 2021-04-02 NOTE — TELEPHONE ENCOUNTER
Refill request received from [facility] for levothyroxine. Last clinic visit with AWA Bartlett 10/2019. Labs done 11/2020 and followed by Dr. Lundberg.     [employer] message sent to patient asking which provider has been managing her levothyroxine and if it should be AWA Bartlett, she should schedule an annual exam. If Dr. Lundberg, should have pharmacy send refill request to Dr. Lundberg.

## 2021-04-03 DIAGNOSIS — E03.9 HYPOTHYROIDISM, UNSPECIFIED TYPE: ICD-10-CM

## 2021-04-05 RX ORDER — LEVOTHYROXINE SODIUM 75 UG/1
75 TABLET ORAL DAILY
Qty: 90 TABLET | Refills: 3 | Status: SHIPPED | OUTPATIENT
Start: 2021-04-05 | End: 2021-10-01

## 2021-04-10 ENCOUNTER — HEALTH MAINTENANCE LETTER (OUTPATIENT)
Age: 46
End: 2021-04-10

## 2021-09-25 ENCOUNTER — HEALTH MAINTENANCE LETTER (OUTPATIENT)
Age: 46
End: 2021-09-25

## 2021-09-30 ENCOUNTER — MYC MEDICAL ADVICE (OUTPATIENT)
Dept: FAMILY MEDICINE | Facility: CLINIC | Age: 46
End: 2021-09-30

## 2021-09-30 DIAGNOSIS — E03.9 HYPOTHYROIDISM, UNSPECIFIED TYPE: ICD-10-CM

## 2021-10-05 RX ORDER — LEVOTHYROXINE SODIUM 75 UG/1
75 TABLET ORAL DAILY
Qty: 90 TABLET | Refills: 1 | Status: SHIPPED | OUTPATIENT
Start: 2021-10-05 | End: 2022-03-28

## 2022-01-15 ENCOUNTER — HEALTH MAINTENANCE LETTER (OUTPATIENT)
Age: 47
End: 2022-01-15

## 2022-01-27 ASSESSMENT — ANXIETY QUESTIONNAIRES
7. FEELING AFRAID AS IF SOMETHING AWFUL MIGHT HAPPEN: MORE THAN HALF THE DAYS
GAD7 TOTAL SCORE: 11
4. TROUBLE RELAXING: SEVERAL DAYS
1. FEELING NERVOUS, ANXIOUS, OR ON EDGE: MORE THAN HALF THE DAYS
5. BEING SO RESTLESS THAT IT IS HARD TO SIT STILL: NEARLY EVERY DAY
3. WORRYING TOO MUCH ABOUT DIFFERENT THINGS: MORE THAN HALF THE DAYS
7. FEELING AFRAID AS IF SOMETHING AWFUL MIGHT HAPPEN: MORE THAN HALF THE DAYS
6. BECOMING EASILY ANNOYED OR IRRITABLE: NOT AT ALL
GAD7 TOTAL SCORE: 11
2. NOT BEING ABLE TO STOP OR CONTROL WORRYING: SEVERAL DAYS

## 2022-01-27 NOTE — PROGRESS NOTES
[facility]  Gynecology Visit    SUBJECTIVE    Anastasia De Paz is a 46 year old  with a history of LEEP (), hypothyroidism (on Levothyroxine), Bipolar disorder type II, anxiety, and depression, who is here for an annual gyn preventive exam. Seeing Dr. Cardona ([facility]).     Patient concerns:     1) GI concerns  - Feels like she has to have a BM constantly for last year, doesn't always have BM when she tries to go  - Doing her own research wondering if she has rectocele  - Noticed bump near anus 1 year ago  - Diarrhea (more often)/constipation  - Had workup 9 years ago  - Intermittent LLQ pain, longstanding  - Denies blood in stool, nausea or vomiting  - Takes probiotics, no other meds    2) Mood   - Feeling okay right now, comes in waves   - No active SI, passive thoughts but Pt is able to redirect herself, no plan   - Was doing therapy in [facility], not currently   - Tried multiple medications without success    *JONATHAN 7 TOTAL SCORE: 11    OBSTETRIC/GYNECOLOGIC HISTORY  Her LMP is: 22  Menarche: 15  Period cycle/length/flow/associated symptoms: 24 day cycle, menstruation lasts 6, regular   Current contraception: Partner has vasectomy  Number of partners in last year: 1  History of STDs: HSV, Not interested in screening today    Lab Results   Component Value Date    PAP NIL 10/10/2016     - History of abnormal Pap smear: Yes: Abnormal glandular Papanicolaou smear of cervix    LEEP   - 10/2016 NIL HPV negative --> due now     Last Mammogram: 2019, recommended annual mammography, patient interested in scheduling, no breast concerns or family hx.    Other PMH:    - Hypothyroidism   - Takes Levoxyl - managed by PCP   - Feels cold and tired sometimes, not sure if its related to thyroid    Social Hx: has 15 yr old son who has autism    PAST MEDICAL HISTORY  Past Medical History:   Diagnosis Date     Abnormal glandular Papanicolaou smear of cervix     LEEP     Allergic rhinitis       Anxiety 2000     Depressive disorder      Depressive disorder, not elsewhere classified     hx of as teen (hosp )     Eating disorder, unspecified     Hx of      Herpes simplex virus (HSV) infection 2019     Hoarseness a few years     Intestinal bacterial overgrowth      Unspecified hypothyroidism     Borderline- TSH 5.20 06. On levoxyl 50 mcg 44/05       MEDICATIONS  Current Outpatient Medications   Medication     levothyroxine (SYNTHROID/LEVOTHROID) 75 MCG tablet     Loratadine (CLARITIN PO)     hydrOXYzine (VISTARIL) 25 MG capsule     No current facility-administered medications for this visit.       ALLERGIES  Allergies   Allergen Reactions     Augmentin [Penicillins] Rash     Ondansetron Other (See Comments)     Tetracycline Rash     Bactrim [Sulfamethoxazole W/Trimethoprim] Rash     Macrobid [Nitrofurantoin] Anxiety     Metronidazole Anxiety and Nausea and Vomiting     Zofran [Methylparaben-Ondansetron-Propylpar] Anxiety         OB History    Para Term  AB Living   2 2 2 0 0 1   SAB IAB Ectopic Multiple Live Births   0 0 0 0 2      # Outcome Date GA Lbr Samuel/2nd Weight Sex Delivery Anes PTL Lv   2 Term 06 40w1d 05:00 3.657 kg (8 lb 1 oz) M    JOSE LUIS      Name: Alexy      Apgar1: 8  Apgar5: 9   1 Term 99 40w0d 07:00 3.09 kg (6 lb 13 oz) F    DEC      Birth Comments: Short Cord      Name: RUPINDER      Obstetric Comments   No pain meds       PAST SURGICAL HISTORY   Past Surgical History:   Procedure Laterality Date     COLONOSCOPY       COLPOSCOPY,LOOP ELECTRD CERVIX EXCIS      normal 06     HC BREATH HYDROGEN TEST  3/21/2013    Procedure: HYDROGEN BREATH TEST;  Surgeon: Matt Briceno MD;  Location:  GI     [facility] ORAL SURGERY PROCEDURE      impacted bicuspids     [facility] PSYCHIATRIC SERVICE/THERAPY      depression       SOCIAL HISTORY  Social History     Tobacco Use     Smoking status: Never Smoker     Smokeless tobacco: Never Used  "  Substance Use Topics     Alcohol use: Yes     Alcohol/week: 0.8 standard drinks     Comment: Rarely (less than once per month), 1 drink on average     Drug use: No     Social History     Social History Narrative    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 She is  with 2 kids, a girl with ADHD and a boy with autism spectrum disorder. She does not smoke and drinks alcohol rarely.                            FAMILY HISTORY  Family History   Problem Relation Age of Onset     Depression Mother      Gynecology Mother         hyst     Thyroid Disease Mother         hypo     Lipids Father         no meds     Cancer Maternal Grandmother         kidney     Other Cancer Maternal Grandmother         kidney cancer     Depression Maternal Grandmother      Thyroid Disease Maternal Grandmother      Depression Maternal Grandfather      Cancer Paternal Grandmother         bladder     Genitourinary Problems Paternal Grandmother         bladder     Other Cancer Paternal Grandmother         bladder cancer     Arthritis Paternal Grandfather      Cerebrovascular Disease Paternal Grandfather         ,     Heart Disease Paternal Grandfather      Dementia Paternal Grandfather      Other Cancer Paternal Grandfather         unknown origin     Depression Maternal Uncle      Thyroid Disease Maternal Aunt         hypo       REVIEW OF SYSTEMS  ROS negative unless noted in the above HPI.    OBJECTIVE  /78 (BP Location: Left arm, Patient Position: Chair)   Pulse 76   Ht 1.6 m (5' 3\")   Wt 57 kg (125 lb 9.6 oz)   BMI 22.25 kg/m    General: Alert, without distress  HEENT: normocephalic, without obvious abnormality; normal thyroid gland   Breast: Within normal limits bilaterally, no nipple discharge, no lymphadenopathy  Cardiovascular: regular rate and rhythm, no murmurs/rubs/gallops   Lungs: clear to auscultation bilaterally   Pelvic: normal external female genitalia; normal vagina without discharge; normal cervix " without lesions/masses; anteverted, mobile, nontender; adnexae nontender and without masses; mild cystocele and rectocele noted on valsalva   Rectum: Flesh colored nodule near anterior aspect of anus; no masses or abnormalies on internal rectal exam   Extremities: normal    ASSESSMENT  Anastasia De Paz is a 46 year old  with a history of LEEP (, most recent pap 10/2016 NIL HPV negative), hypothyroidism (on Levoxyl), Bipolar disorder type II, anxiety, and depression, who is here for an annual gyn preventive exam. Seeing Dr. Cardona ([facility]) for general care.    PLAN    #Annual Preventive Exam  - Screening   Cervical cancer: Pap today (Cyt+HPV)   Mammography yearly (last: ): Placed referral  - No immediate concerns with Pt's hx of Bipolar, A/D (No active SI), and hypothyroidism-Pt to follow with Dr. Lundberg to address the rest of her preventive care/medical hx    #Rectocele  #Cycelocele  - Noted mild cystocele and rectocele on exam today  - Rectal exam normal, no concern for mass in rectal vault  - Discussed that Pt's symptoms of incomplete evaluation may be a result of rectocele.  Other GI symptoms, like diarrhea, unlikely related.   - Discussed primary treatment for this condition, which includes pelvic floor PT- Pt would like to hold off for now given insurance coverage. Can revisit this an an option in the future.  - Educated Pt on ways to strengthen pelvic floor muscles on her own (I.e. Kegel exercises)  - Continue other GI care and evaluation of symptoms with PCP.    Counseled pt on self breast awareness.   RTC in one year for an annual examination.     I, Denise Barr, completed the PFSH and ROS. I then acted as the scribe for MARLI Lazar, for the remainder of the visit.  Denise Barr, MS3  Medical Student, [facility]    I agree with the PFSH and ROS as completed by the MARLI Student, except for changes made by me.  The remainder of the encounter was  performed by me and scribed by the NP Student.  The scribed note accurately reflects my personal services and decisions made by me.  Isi Bartlett, DNP, APRN, NP

## 2022-01-28 ENCOUNTER — OFFICE VISIT (OUTPATIENT)
Dept: OBGYN | Facility: CLINIC | Age: 47
End: 2022-01-28
Attending: NURSE PRACTITIONER
Payer: COMMERCIAL

## 2022-01-28 VITALS
HEIGHT: 63 IN | SYSTOLIC BLOOD PRESSURE: 116 MMHG | BODY MASS INDEX: 22.25 KG/M2 | HEART RATE: 76 BPM | WEIGHT: 125.6 LBS | DIASTOLIC BLOOD PRESSURE: 78 MMHG

## 2022-01-28 DIAGNOSIS — N81.11 CYSTOCELE, MIDLINE: ICD-10-CM

## 2022-01-28 DIAGNOSIS — Z12.31 ENCOUNTER FOR SCREENING MAMMOGRAM FOR BREAST CANCER: ICD-10-CM

## 2022-01-28 DIAGNOSIS — Z12.4 SCREENING FOR CERVICAL CANCER: ICD-10-CM

## 2022-01-28 DIAGNOSIS — Z01.419 VISIT FOR GYNECOLOGIC EXAMINATION: Primary | ICD-10-CM

## 2022-01-28 DIAGNOSIS — N81.6 RECTOCELE: ICD-10-CM

## 2022-01-28 PROCEDURE — G0145 SCR C/V CYTO,THINLAYER,RESCR: HCPCS | Performed by: NURSE PRACTITIONER

## 2022-01-28 PROCEDURE — G0463 HOSPITAL OUTPT CLINIC VISIT: HCPCS

## 2022-01-28 PROCEDURE — 99386 PREV VISIT NEW AGE 40-64: CPT | Performed by: NURSE PRACTITIONER

## 2022-01-28 PROCEDURE — 87624 HPV HI-RISK TYP POOLED RSLT: CPT | Performed by: NURSE PRACTITIONER

## 2022-01-28 ASSESSMENT — ANXIETY QUESTIONNAIRES
5. BEING SO RESTLESS THAT IT IS HARD TO SIT STILL: SEVERAL DAYS
GAD7 TOTAL SCORE: 11
1. FEELING NERVOUS, ANXIOUS, OR ON EDGE: MORE THAN HALF THE DAYS
GAD7 TOTAL SCORE: 11
3. WORRYING TOO MUCH ABOUT DIFFERENT THINGS: MORE THAN HALF THE DAYS
2. NOT BEING ABLE TO STOP OR CONTROL WORRYING: MORE THAN HALF THE DAYS
6. BECOMING EASILY ANNOYED OR IRRITABLE: NOT AT ALL
7. FEELING AFRAID AS IF SOMETHING AWFUL MIGHT HAPPEN: MORE THAN HALF THE DAYS

## 2022-01-28 ASSESSMENT — PATIENT HEALTH QUESTIONNAIRE - PHQ9
SUM OF ALL RESPONSES TO PHQ QUESTIONS 1-9: 8
5. POOR APPETITE OR OVEREATING: MORE THAN HALF THE DAYS

## 2022-01-28 ASSESSMENT — MIFFLIN-ST. JEOR: SCORE: 1188.85

## 2022-01-28 NOTE — PATIENT INSTRUCTIONS
Do routine Kegals: Clench muscles and hold for 10 seconds, repeat 14 more times (set of 15); do this three times per day. After that becomes easier you can do 2 sets of 15, 3 times per day.    May consider pelvic floor physical therapy at anytme

## 2022-01-28 NOTE — LETTER
2022       RE: Anastasia De Paz  [address]  [place] [address]     Dear Colleague,    Thank you for referring your patient, Anastasia De Paz, to the [facility] [place] [facility]. Please see a copy of my visit note below.      [facility]  Gynecology Visit    SUBJECTIVE    Anastasia De Paz is a 46 year old  with a history of LEEP (), hypothyroidism (on Levothyroxine), Bipolar disorder type II, anxiety, and depression, who is here for an annual gyn preventive exam. Seeing Dr. Cardona ([facility]).     Patient concerns:     1) GI concerns  - Feels like she has to have a BM constantly for last year, doesn't always have BM when she tries to go  - Doing her own research wondering if she has rectocele  - Noticed bump near anus 1 year ago  - Diarrhea (more often)/constipation  - Had workup 9 years ago  - Intermittent LLQ pain, longstanding  - Denies blood in stool, nausea or vomiting  - Takes probiotics, no other meds    2) Mood   - Feeling okay right now, comes in waves   - No active SI, passive thoughts but Pt is able to redirect herself, no plan   - Was doing therapy in [facility], not currently   - Tried multiple medications without success    *JONATHAN 7 TOTAL SCORE: 11    OBSTETRIC/GYNECOLOGIC HISTORY  Her LMP is: 22  Menarche: 15  Period cycle/length/flow/associated symptoms: 24 day cycle, menstruation lasts 6, regular   Current contraception: Partner has vasectomy  Number of partners in last year: 1  History of STDs: HSV, Not interested in screening today    Lab Results   Component Value Date    PAP NIL 10/10/2016     - History of abnormal Pap smear: Yes: Abnormal glandular Papanicolaou smear of cervix    LEEP   - 10/2016 NIL HPV negative --> due now     Last Mammogram: 2019, recommended annual mammography, patient interested in scheduling, no breast concerns or family hx.    Other  PMH:    - Hypothyroidism   - Takes Levoxyl - managed by PCP   - Feels cold and tired sometimes, not sure if its related to thyroid    Social Hx: has 15 yr old son who has autism    PAST MEDICAL HISTORY  Past Medical History:   Diagnosis Date     Abnormal glandular Papanicolaou smear of cervix     LEEP     Allergic rhinitis      Anxiety 2000     Depressive disorder      Depressive disorder, not elsewhere classified     hx of as teen (hosp )     Eating disorder, unspecified     Hx of      Herpes simplex virus (HSV) infection 2019     Hoarseness a few years     Intestinal bacterial overgrowth      Unspecified hypothyroidism     Borderline- TSH 5.20 06. On levoxyl 50 mcg 44/05       MEDICATIONS  Current Outpatient Medications   Medication     levothyroxine (SYNTHROID/LEVOTHROID) 75 MCG tablet     Loratadine (CLARITIN PO)     hydrOXYzine (VISTARIL) 25 MG capsule     No current facility-administered medications for this visit.       ALLERGIES  Allergies   Allergen Reactions     Augmentin [Penicillins] Rash     Ondansetron Other (See Comments)     Tetracycline Rash     Bactrim [Sulfamethoxazole W/Trimethoprim] Rash     Macrobid [Nitrofurantoin] Anxiety     Metronidazole Anxiety and Nausea and Vomiting     Zofran [Methylparaben-Ondansetron-Propylpar] Anxiety         OB History    Para Term  AB Living   2 2 2 0 0 1   SAB IAB Ectopic Multiple Live Births   0 0 0 0 2      # Outcome Date GA Lbr Samuel/2nd Weight Sex Delivery Anes PTL Lv   2 Term 06 40w1d 05:00 3.657 kg (8 lb 1 oz) M    JOSE LUIS      Name: Alexy      Apgar1: 8  Apgar5: 9   1 Term 99 40w0d 07:00 3.09 kg (6 lb 13 oz) F    DEC      Birth Comments: Short Cord      Name: RUPINDER      Obstetric Comments   No pain meds       PAST SURGICAL HISTORY   Past Surgical History:   Procedure Laterality Date     COLONOSCOPY  2013     COLPOSCOPY,LOOP ELECTRD CERVIX EXCIS  2001    normal 06     HC BREATH HYDROGEN TEST  3/21/2013  "   Procedure: HYDROGEN BREATH TEST;  Surgeon: Matt Briceno MD;  Location: [facility] ORAL SURGERY PROCEDURE  1989    impacted bicuspids     [facility] PSYCHIATRIC SERVICE/THERAPY  1993    depression       SOCIAL HISTORY  Social History     Tobacco Use     Smoking status: Never Smoker     Smokeless tobacco: Never Used   Substance Use Topics     Alcohol use: Yes     Alcohol/week: 0.8 standard drinks     Comment: Rarely (less than once per month), 1 drink on average     Drug use: No     Social History     Social History Narrative    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 She is  with 2 kids, a girl with ADHD and a boy with autism spectrum disorder. She does not smoke and drinks alcohol rarely.                            FAMILY HISTORY  Family History   Problem Relation Age of Onset     Depression Mother      Gynecology Mother         hyst     Thyroid Disease Mother         hypo     Lipids Father         no meds     Cancer Maternal Grandmother         kidney     Other Cancer Maternal Grandmother         kidney cancer     Depression Maternal Grandmother      Thyroid Disease Maternal Grandmother      Depression Maternal Grandfather      Cancer Paternal Grandmother         bladder     Genitourinary Problems Paternal Grandmother         bladder     Other Cancer Paternal Grandmother         bladder cancer     Arthritis Paternal Grandfather      Cerebrovascular Disease Paternal Grandfather         ,     Heart Disease Paternal Grandfather      Dementia Paternal Grandfather      Other Cancer Paternal Grandfather         unknown origin     Depression Maternal Uncle      Thyroid Disease Maternal Aunt         hypo       REVIEW OF SYSTEMS  ROS negative unless noted in the above HPI.    OBJECTIVE  /78 (BP Location: Left arm, Patient Position: Chair)   Pulse 76   Ht 1.6 m (5' 3\")   Wt 57 kg (125 lb 9.6 oz)   BMI 22.25 kg/m    General: Alert, without distress  HEENT: normocephalic, without obvious " abnormality; normal thyroid gland   Breast: Within normal limits bilaterally, no nipple discharge, no lymphadenopathy  Cardiovascular: regular rate and rhythm, no murmurs/rubs/gallops   Lungs: clear to auscultation bilaterally   Pelvic: normal external female genitalia; normal vagina without discharge; normal cervix without lesions/masses; anteverted, mobile, nontender; adnexae nontender and without masses; mild cystocele and rectocele noted on valsalva   Rectum: Flesh colored nodule near anterior aspect of anus; no masses or abnormalies on internal rectal exam   Extremities: normal    ASSESSMENT  Anastasia De Paz is a 46 year old  with a history of LEEP (, most recent pap 10/2016 NIL HPV negative), hypothyroidism (on Levoxyl), Bipolar disorder type II, anxiety, and depression, who is here for an annual gyn preventive exam. Seeing Dr. Cardona ([facility]) for general care.    PLAN    #Annual Preventive Exam  - Screening   Cervical cancer: Pap today (Cyt+HPV)   Mammography yearly (last: ): Placed referral  - No immediate concerns with Pt's hx of Bipolar, A/D (No active SI), and hypothyroidism-Pt to follow with Dr. Lundberg to address the rest of her preventive care/medical hx    #Rectocele  #Cycelocele  - Noted mild cystocele and rectocele on exam today  - Rectal exam normal, no concern for mass in rectal vault  - Discussed that Pt's symptoms of incomplete evaluation may be a result of rectocele.  Other GI symptoms, like diarrhea, unlikely related.   - Discussed primary treatment for this condition, which includes pelvic floor PT- Pt would like to hold off for now given insurance coverage. Can revisit this an an option in the future.  - Educated Pt on ways to strengthen pelvic floor muscles on her own (I.e. Kegel exercises)  - Continue other GI care and evaluation of symptoms with PCP.    Counseled pt on self breast awareness.   RTC in one year for an annual examination.     Denise DELGADO,  completed the PFSH and ROS. I then acted as the scribe for MARLI Lazar, for the remainder of the visit.  Denise Barr, MS3  Medical Student, [facility]    I agree with the PFSH and ROS as completed by the MARLI Student, except for changes made by me.  The remainder of the encounter was performed by me and scribed by the MARLI Student.  The scribed note accurately reflects my personal services and decisions made by me.  Isi Bartlett, KELLY, APRN, MARLI

## 2022-02-01 LAB
BKR LAB AP GYN ADEQUACY: NORMAL
BKR LAB AP GYN INTERPRETATION: NORMAL
BKR LAB AP HPV REFLEX: NORMAL
BKR LAB AP PREVIOUS ABNORMAL: NORMAL
PATH REPORT.COMMENTS IMP SPEC: NORMAL
PATH REPORT.COMMENTS IMP SPEC: NORMAL
PATH REPORT.RELEVANT HX SPEC: NORMAL

## 2022-02-02 LAB
HUMAN PAPILLOMA VIRUS 16 DNA: NEGATIVE
HUMAN PAPILLOMA VIRUS 18 DNA: NEGATIVE
HUMAN PAPILLOMA VIRUS FINAL DIAGNOSIS: NORMAL
HUMAN PAPILLOMA VIRUS OTHER HR: NEGATIVE

## 2022-03-24 DIAGNOSIS — E03.9 HYPOTHYROIDISM, UNSPECIFIED TYPE: ICD-10-CM

## 2022-03-28 RX ORDER — LEVOTHYROXINE SODIUM 75 UG/1
75 TABLET ORAL DAILY
Qty: 90 TABLET | Refills: 0 | Status: SHIPPED | OUTPATIENT
Start: 2022-03-28 | End: 2022-08-28

## 2022-06-29 NOTE — PROGRESS NOTES
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 Newer onset of a cough 2 weeks ago. Does not wake her from sleep. On 4/7, had temp  of 102.5, chills, had sore throat, and body aches, no GI symptoms. Was not tested for flu. 4/14 had GI distress. 4/21 had onset of cough. No chest pain, sob.  Non-productive. No one around her coughing. Never smoker. They have cats at home (has had for 7 years, no issues). These are inside cats only.     Stopped loratadine shortly after seeing ENT in late March.     Concern for Petechiae First on left inner thigh. Then a week later, a bunch popped up on inner upper arm. Has never gotten them before. Each lasted for about a week. Denies any trauma. Non-pruritic, non-tender. No one else with them. She thinks she bruises sort of easily. 2/2018 platelets and Hgb normal, no PT/INR on file. No family history of easy bruising.  Pt brings in picture of upper arm.    Medications and allergies reviewed by me today.     ROS:   Constitutional, neuro, ENT, endocrine, pulmonary, cardiac, gastrointestinal, genitourinary, musculoskeletal, integument and psychiatric systems are negative, except as otherwise noted.    OBJECTIVE:    /69  Pulse 73  Temp 97.7  F (36.5  C)  Resp 12  Wt 49.5 kg (109 lb 3.2 oz)  SpO2 98%  BMI 19.34 kg/m2   Wt Readings from Last 1 Encounters:   05/03/18 49.5 kg (109 lb 3.2 oz)       GENERAL APPEARANCE: healthy, alert and no distress     EYES: EOMI, PERRL     HENT: ear canals and TM's normal and nose and mouth without ulcers or lesions     NECK: no adenopathy, no asymmetry, masses, or scars and thyroid normal to palpation     RESP: lungs clear to auscultation - no " rales, rhonchi or wheezes     CV: regular rates and rhythm, normal S1 S2, no S3 or S4 and no murmur, click or rub     ABDOMEN:  soft, nontender, no HSM or masses and bowel sounds normal     MS: extremities normal- no gross deformities noted, no evidence of inflammation in joints, FROM in all extremities.     SKIN: Faint residual slightly hyperpigmented 1-2mm lesions on upper inner left arm     NEURO: Normal strength and tone, sensory exam grossly normal, mentation intact and speech normal     PSYCH: mentation appears normal. and affect normal/bright     LYMPHATICS: No cervical adenopathy  Erythema      ASSESSMENT/PLAN:    Anastasia was seen today for cough and derm problem.    Diagnoses and all orders for this visit:    Post-nasal drip:  Given patient's known history of allergies, and recent cessation of loratadine approximately 1-2 months ago, suspect that patient's cough may be related to allergies and postnasal drip that is inadequately treated during seasonal allergy season.  Patient was instructed to restart loratadine, and was also given symptomatic treatment with Tessalon Perles.  Patient appeared nontoxic, afebrile, and was hemodynamically stable.  Patient was instructed to seek medical attention if she developed signs of fevers, chills, trouble breathing or chest pain.  Patient has follow-up with ENT as scheduled on May 10.  -     benzonatate (TESSALON) 200 MG capsule; Take 1 capsule (200 mg) by mouth 3 times daily as needed for cough    Rash and nonspecific skin eruption  Unclear etiology.  Based on cell phone picture, patient appears to have had a small abruption of what appear similar to petechiae  on her left upper inner arm and left upper inner thigh.  However, this was resolved at the time of our visit.  The eruption only lasted for approximately 1 week, and was nonpruritic and nontender.  Differential diagnosis includes mild trauma to the area from sleeping on her left side, arthropod bite, vasculitis.   Lower suspicion for coagulation disorder given normal hemoglobin and platelets.  Distribution is also not typical of coagulation disorder, as one would expect petechia to appear on lower extremities.     Pt should return to clinic for f/u with Dr. Tamika Florence MD  May 3, 2018    Pt was seen and plan of care discussed with Dr. Su.   Ms Colbert was seen and examined with the resident Dr Florence;I have reviewed her note and plan for future care and I agree.  Lucio Su MD   Inflammation Suggestive Of Cancer Camouflage Histology Text: There was a dense lymphocytic infiltrate which prevented adequate histologic evaluation of adjacent structures.

## 2022-07-02 ENCOUNTER — HEALTH MAINTENANCE LETTER (OUTPATIENT)
Age: 47
End: 2022-07-02

## 2022-08-28 ENCOUNTER — MYC REFILL (OUTPATIENT)
Dept: FAMILY MEDICINE | Facility: CLINIC | Age: 47
End: 2022-08-28

## 2022-08-28 DIAGNOSIS — E03.9 HYPOTHYROIDISM, UNSPECIFIED TYPE: ICD-10-CM

## 2022-08-29 NOTE — TELEPHONE ENCOUNTER
levothyroxine (SYNTHROID/LEVOTHROID) 75 MCG tablet      Last Written Prescription Date:  3/28/22  Last Fill Quantity: 90,   # refills: 0  Last Office Visit : 11/6/20  Future Office visit:  None scheduled    Routing refill request to provider for review/approval because:  Overdue for TSH  Lab Test 11/06/20  1012   TSH 3.62     > 18 months since last visit

## 2022-08-31 RX ORDER — LEVOTHYROXINE SODIUM 75 UG/1
75 TABLET ORAL DAILY
Qty: 90 TABLET | Refills: 0 | Status: SHIPPED | OUTPATIENT
Start: 2022-08-31 | End: 2022-11-09

## 2022-11-06 DIAGNOSIS — E03.9 HYPOTHYROIDISM, UNSPECIFIED TYPE: ICD-10-CM

## 2022-11-09 RX ORDER — LEVOTHYROXINE SODIUM 75 UG/1
75 TABLET ORAL DAILY
Qty: 30 TABLET | Refills: 0 | Status: SHIPPED | OUTPATIENT
Start: 2022-11-09 | End: 2022-12-21

## 2022-11-09 NOTE — TELEPHONE ENCOUNTER
MYLAN-LEVOTHYROX 75MCG TABLET  Last Written Prescription Date:   8/21/2022  Last Fill Quantity: 90,   # refills: 0  Last Office Visit :  11/6/2020  Future Office visit:  None    Routing refill request to provider for review/approval because:  Several request  Needs updated office visit for further refills  Please call Pt to schedule.       Sangeeta Price RN  Central Triage Red Flags/Med Refills

## 2022-12-19 DIAGNOSIS — E03.9 HYPOTHYROIDISM, UNSPECIFIED TYPE: ICD-10-CM

## 2022-12-21 RX ORDER — LEVOTHYROXINE SODIUM 75 UG/1
75 TABLET ORAL DAILY
Qty: 90 TABLET | Refills: 0 | Status: SHIPPED | OUTPATIENT
Start: 2022-12-21 | End: 2023-05-12

## 2022-12-26 ENCOUNTER — HEALTH MAINTENANCE LETTER (OUTPATIENT)
Age: 47
End: 2022-12-26

## 2023-03-02 ENCOUNTER — VIRTUAL VISIT (OUTPATIENT)
Dept: PSYCHIATRY | Facility: CLINIC | Age: 48
End: 2023-03-02
Attending: NURSE PRACTITIONER
Payer: COMMERCIAL

## 2023-03-02 DIAGNOSIS — F90.2 ATTENTION DEFICIT HYPERACTIVITY DISORDER (ADHD), COMBINED TYPE: Primary | ICD-10-CM

## 2023-03-02 PROCEDURE — 99215 OFFICE O/P EST HI 40 MIN: CPT | Mod: VID | Performed by: NURSE PRACTITIONER

## 2023-03-02 RX ORDER — METHYLPHENIDATE HYDROCHLORIDE 18 MG/1
18 TABLET ORAL EVERY MORNING
Qty: 30 TABLET | Refills: 0 | Status: SHIPPED | OUTPATIENT
Start: 2023-03-02 | End: 2023-03-31

## 2023-03-02 RX ORDER — LEVOTHYROXINE SODIUM 88 UG/1
TABLET ORAL
COMMUNITY
Start: 2023-01-17

## 2023-03-02 NOTE — PATIENT INSTRUCTIONS
-Start Concerta 18 mg in AM for ADHD. Monitor for changes in sleep, anxiety, irritability and appetite.  Please check your blood pressure at least 2 times a week in consistent time.    Your next appointment is scheduled on 3/31/2023 (Fri) at 10am.    **For crisis resources, please see the information at the end of this document**   Patient Education    Thank you for coming to the [facility].     Lab Testing:  If you had lab testing today and your results are reassuring or normal they will be mailed to you or sent through [employer] within 7 days. If the lab tests need quick action we will call you with the results. The phone number we will call with results is # [phone]. If this is not the best number please call our clinic and change the number.     Medication Refills:  If you need any refills please call your pharmacy and they will contact us. Our fax number for refills is [phone].   Three business days of notice are needed for general medication refill requests.   Five business days of notice are needed for controlled substance refill requests.   If you need to change to a different pharmacy, please contact the new pharmacy directly. The new pharmacy will help you get your medications transferred.     Contact Us:  Please call [phone] during business hours (8-5:00 M-F).   If you have medication related questions after clinic hours, or on the weekend, please call [phone].     Financial Assistance [phone]   Medical Records [phone]       MENTAL HEALTH CRISIS RESOURCES:  For a emergency help, please call 911 or go to the nearest Emergency Department.     Emergency Walk-In Options:   EmPATH Unit @ [place] [facility] ([place]): [phone] - Specialized mental health emergency area designed to be calming  [facility] ([place]): [phone]  [facility] Acute Psychiatry Services ([place]): [phone]  [facility] ([place]): [phone]    [facility] Crisis Information:   [place]: [phone]  [place]: [phone]  [place] ([place]) - Adult: [phone]     Child: [phone]  [place] - Adult: [phone]     Child: [phone]  [place]: [phone]  List of all [facility] resources:   [email or web address]/dhs/people-[email or web address]-serve/adults/health-care/mental-health/resources/crisis-contacts.jsp    National Crisis Information:   Crisis Text Line: Text  [place]  to 125697  Suicide & Crisis Lifeline: 988  National Suicide Prevention Lifeline: 7-150-624-DELV (1-227.955.6010)       For online chat options, visit https://suicidepreventionlifeline.org/chat/  Poison Control Center: 1-492.916.1351  Trans Lifeline: 1-553.709.2511 - Hotline for transgender people of all ages  The [employer] Project: 2-562-928-5644 - Hotline for LGBT youth     For Non-Emergency Support:   Fast Tracker: Mental Health & Substance Use Disorder Resources -   https://www.Seeqn.org/

## 2023-03-02 NOTE — NURSING NOTE
Is the patient currently in the state of [place]? YES    Visit mode:VIDEO    If the visit is dropped, the patient can be reconnected by: VIDEO VISIT: Text to cell phone:   Telephone Information:   Mobile [phone]       Will anyone else be joining the visit? No  (If patient encounters technical issues they should call [phone])    How would you like to obtain your AVS? MyChart    Are changes needed to the allergy or medication list? NO        Reason for visit:  follow up    DANIEL Goode
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
## 2023-03-02 NOTE — PROGRESS NOTES
Anastasia De Paz is a 47 year old who is being evaluated via a billable video visit.      Pt will join video visit via: [employer]  If there are problems joining the visit, send backup video invite via: Text to preferred phone: [phone]    Originating location (patient location): Patient's home    Will anyone else be joining the visit? No

## 2023-03-31 ENCOUNTER — VIRTUAL VISIT (OUTPATIENT)
Dept: PSYCHIATRY | Facility: CLINIC | Age: 48
End: 2023-03-31
Attending: NURSE PRACTITIONER
Payer: COMMERCIAL

## 2023-03-31 DIAGNOSIS — F90.2 ATTENTION DEFICIT HYPERACTIVITY DISORDER (ADHD), COMBINED TYPE: Primary | ICD-10-CM

## 2023-03-31 PROCEDURE — 99214 OFFICE O/P EST MOD 30 MIN: CPT | Mod: VID | Performed by: NURSE PRACTITIONER

## 2023-03-31 RX ORDER — METHYLPHENIDATE HYDROCHLORIDE 18 MG/1
18 TABLET ORAL EVERY MORNING
Qty: 30 TABLET | Refills: 0 | Status: SHIPPED | OUTPATIENT
Start: 2023-03-31 | End: 2023-05-12 | Stop reason: DRUGHIGH

## 2023-03-31 NOTE — PATIENT INSTRUCTIONS
-Continue on current medication regimen for now.  Please monitor blood pressure 2 times a week in next 2 weeks and send them to mile via [employer]. If this is normal, let's plan on increasing Concerta.    Your next appointment is scheduled on 5/12/2023 (Fri) at 10am.        **For crisis resources, please see the information at the end of this document**   Patient Education    Thank you for coming to the [facility] [place] [facility].     Lab Testing:  If you had lab testing today and your results are reassuring or normal they will be mailed to you or sent through [employer] within 7 days. If the lab tests need quick action we will call you with the results. The phone number we will call with results is # [phone]. If this is not the best number please call our clinic and change the number.     Medication Refills:  If you need any refills please call your pharmacy and they will contact us. Our fax number for refills is [phone].   Three business days of notice are needed for general medication refill requests.   Five business days of notice are needed for controlled substance refill requests.   If you need to change to a different pharmacy, please contact the new pharmacy directly. The new pharmacy will help you get your medications transferred.     Contact Us:  Please call [phone] during business hours (8-5:00 M-F).   If you have medication related questions after clinic hours, or on the weekend, please call [phone].     Financial Assistance [phone]   Medical Records [phone]       MENTAL HEALTH CRISIS RESOURCES:  For a emergency help, please call 911 or go to the nearest Emergency Department.     Emergency Walk-In Options:   EmPATH Unit @ [facility] ([place]): [phone] - Specialized mental health emergency area designed to be calming  [facility] ([place]): [phone]  [facility] ([place]):  [phone]  [facility] ([place]): [phone]    [facility] Crisis Information:   [place]: [phone]  [place]: [phone]  [place] MEIER) - Adult: [phone]     Child: [phone]  [place] - Adult: [phone]     Child: [phone]  [place]: [phone]  List of all [facility] resources:   https://[place].[facility]/dhs/people-we-serve/adults/health-care/mental-health/resources/crisis-contacts.jsp    National Crisis Information:   Crisis Text Line: Text  [place]  to 930407  Suicide & Crisis Lifeline: 988  National Suicide Prevention Lifeline: 3-188-007-TALK (1-728.788.5146)       For online chat options, visit https://suicidepreventionlifeline.org/chat/  Poison Control Center: 1-364.876.1083  Trans Lifeline: 2-723-799-2747 - Hotline for transgender people of all ages  The [employer]: 2-945-638-9478 - Hotline for LGBT youth     For Non-Emergency Support:   Fast Tracker: Mental Health & Substance Use Disorder Resources -   https://www.[employer].org/
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
## 2023-03-31 NOTE — NURSING NOTE
Is the patient currently in the state of [place]? YES    Visit mode:VIDEO    If the visit is dropped, the patient can be reconnected by: VIDEO VISIT: Send to e-mail at: [email or web address]    Will anyone else be joining the visit? NO      How would you like to obtain your AVS? MyChart    Are changes needed to the allergy or medication list? NO    Reason for visit: Med check- Concerta

## 2023-04-22 ENCOUNTER — HEALTH MAINTENANCE LETTER (OUTPATIENT)
Age: 48
End: 2023-04-22

## 2023-04-25 ENCOUNTER — MYC MEDICAL ADVICE (OUTPATIENT)
Dept: PSYCHIATRY | Facility: CLINIC | Age: 48
End: 2023-04-25
Payer: COMMERCIAL

## 2023-04-25 DIAGNOSIS — F90.2 ATTENTION DEFICIT HYPERACTIVITY DISORDER (ADHD), COMBINED TYPE: Primary | ICD-10-CM

## 2023-04-25 RX ORDER — METHYLPHENIDATE HYDROCHLORIDE 27 MG/1
27 TABLET ORAL EVERY MORNING
Qty: 30 TABLET | Refills: 0 | Status: SHIPPED | OUTPATIENT
Start: 2023-04-25 | End: 2023-04-25

## 2023-04-25 RX ORDER — METHYLPHENIDATE HYDROCHLORIDE 27 MG/1
27 TABLET ORAL EVERY MORNING
Qty: 30 TABLET | Refills: 0 | Status: SHIPPED | OUTPATIENT
Start: 2023-04-25 | End: 2023-05-12

## 2023-05-12 ENCOUNTER — VIRTUAL VISIT (OUTPATIENT)
Dept: PSYCHIATRY | Facility: CLINIC | Age: 48
End: 2023-05-12
Attending: NURSE PRACTITIONER
Payer: COMMERCIAL

## 2023-05-12 DIAGNOSIS — F90.2 ATTENTION DEFICIT HYPERACTIVITY DISORDER (ADHD), COMBINED TYPE: Primary | ICD-10-CM

## 2023-05-12 PROCEDURE — 99214 OFFICE O/P EST MOD 30 MIN: CPT | Mod: VID | Performed by: NURSE PRACTITIONER

## 2023-05-12 RX ORDER — METHYLPHENIDATE HYDROCHLORIDE 27 MG/1
27 TABLET ORAL EVERY MORNING
Qty: 30 TABLET | Refills: 0 | Status: SHIPPED | OUTPATIENT
Start: 2023-05-23 | End: 2023-06-09
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
## 2023-05-12 NOTE — NURSING NOTE
Is the patient currently in the state of [place]? YES    Visit mode:VIDEO    If the visit is dropped, the patient can be reconnected by: VIDEO VISIT:  Send e-mail to at [email or web address]    Will anyone else be joining the visit? No  (If patient encounters technical issues they should call [phone])    How would you like to obtain your AVS? MyChart    Are changes needed to the allergy or medication list? YES see med list    Rooming Documentation: Questionnaire(s) not assigned, not done per department protocol.    Reason for visit: Video Visit     DANIEL Babcock

## 2023-05-12 NOTE — PATIENT INSTRUCTIONS
-Continue on current medication regimen. Monitor changes in anxiety/agitation, feeling of teeth grinding and sleep.    Your next appointment is scheduled on 6/9/2023 (Fri) at 12:30pm.    Thank you for coming to the [facility] MENTAL HEALTH & ADDICTION [place] [facility].    Lab Testing:  If you had lab testing today and your results are reassuring or normal they will be mailed to you or sent through [employer] within 7 days. If the lab tests need quick action we will call you with the results. The phone number we will call with results is # [phone] (home) . If this is not the best number please call our clinic and change the number.    Medication Refills:  If you need any refills please call your pharmacy and they will contact us. Our fax number for refills is [phone]. Please allow three business for refill processing. If you need to  your refill at a new pharmacy, please contact the new pharmacy directly. The new pharmacy will help you get your medications transferred.     Scheduling:  If you have any concerns about today's visit or wish to schedule another appointment please call our office during normal business hours [phone] (8-5:00 M-F)    Contact Us:  Please call [phone] during business hours (8-5:00 M-F).  If after clinic hours, or on the weekend, please call  [phone].    Financial Assistance [phone]  [facility] Billing [phone]  [place] Billing [facility], [facility]: [phone]  [facility] Billing [phone]  Medical Records [phone]      MENTAL HEALTH CRISIS NUMBERS:  For a medical emergency please call  911 or go to the nearest ER.     [facility]:   [facility] -[phone]   Crisis Residence [facility] -[phone]   Walk-In Counseling Center [facility] -[phone]   COPE 24/7 [place] Mobile Team -[phone] (adults)/[phone] (child)  CHILD: [facility] needs assessment team - [phone]      [facility]:   [facility]  [facility] - [phone]   Walk-in counseling [facility] - [phone]   Walk-in counseling [facility] - [phone]   Crisis Residence [facility] - [phone]  Urgent Care Adult Mental Tgtezn-[phone] mobile unit/ 24/7 crisis line    National Crisis Numbers:   National Suicide Prevention Lifeline: 7-321-997-TALK (798-222-2141)  Poison Control Center - 1-356-513-0140  [facility]/resources for a list of additional resources (SOS)  Trans Lifeline a hotline for transgender people 4-610-841-4744  The Alexis Project a hotline for LGBT youth 1-936.276.2901  Crisis Text Line: For any crisis 24/7   To: 666512  see www.crisistextline.org  - IF MAKING A CALL FEELS TOO HARD, send a text!         Again thank you for choosing [facility] MENTAL HEALTH & ADDICTION [facility] and please let us know how we can best partner with you to improve you and your family's health.    You may be receiving a survey regarding this appointment. We would love to have your feedback, both positive and negative. The survey is done by an external company, so your answers are anonymous.

## 2023-06-09 ENCOUNTER — VIRTUAL VISIT (OUTPATIENT)
Dept: PSYCHIATRY | Facility: CLINIC | Age: 48
End: 2023-06-09
Attending: NURSE PRACTITIONER
Payer: COMMERCIAL

## 2023-06-09 DIAGNOSIS — F41.9 ANXIETY: ICD-10-CM

## 2023-06-09 DIAGNOSIS — F90.2 ATTENTION DEFICIT HYPERACTIVITY DISORDER (ADHD), COMBINED TYPE: Primary | ICD-10-CM

## 2023-06-09 PROCEDURE — 99214 OFFICE O/P EST MOD 30 MIN: CPT | Mod: VID | Performed by: NURSE PRACTITIONER

## 2023-06-09 RX ORDER — METHYLPHENIDATE HYDROCHLORIDE 54 MG/1
TABLET, EXTENDED RELEASE ORAL
COMMUNITY
End: 2023-06-09

## 2023-06-09 RX ORDER — METHYLPHENIDATE HYDROCHLORIDE 27 MG/1
27 TABLET ORAL EVERY MORNING
Qty: 30 TABLET | Refills: 0 | Status: SHIPPED | OUTPATIENT
Start: 2023-07-07 | End: 2023-07-14 | Stop reason: DRUGHIGH

## 2023-06-09 RX ORDER — METHYLPHENIDATE HYDROCHLORIDE 27 MG/1
27 TABLET ORAL EVERY MORNING
Qty: 30 TABLET | Refills: 0 | Status: SHIPPED | OUTPATIENT
Start: 2023-06-09 | End: 2023-07-14 | Stop reason: DRUGHIGH

## 2023-06-09 RX ORDER — HYDROXYZINE PAMOATE 25 MG/1
25-50 CAPSULE ORAL 4 TIMES DAILY PRN
Qty: 60 CAPSULE | Refills: 1 | Status: SHIPPED | OUTPATIENT
Start: 2023-06-09

## 2023-06-09 ASSESSMENT — PATIENT HEALTH QUESTIONNAIRE - PHQ9
10. IF YOU CHECKED OFF ANY PROBLEMS, HOW DIFFICULT HAVE THESE PROBLEMS MADE IT FOR YOU TO DO YOUR WORK, TAKE CARE OF THINGS AT HOME, OR GET ALONG WITH OTHER PEOPLE: SOMEWHAT DIFFICULT
SUM OF ALL RESPONSES TO PHQ QUESTIONS 1-9: 8
SUM OF ALL RESPONSES TO PHQ QUESTIONS 1-9: 8

## 2023-06-09 NOTE — NURSING NOTE
Is the patient currently in the state of [place]? YES    Visit mode:VIDEO    If the visit is dropped, the patient can be reconnected by: VIDEO VISIT: Send to e-mail at: [email or web address]    Will anyone else be joining the visit? NO      How would you like to obtain your AVS? MyChart    Are changes needed to the allergy or medication list? NO    Reason for visit: RECHECK      Alyce Galindo VF
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
## 2023-06-09 NOTE — PATIENT INSTRUCTIONS
-Continue on current medication regimen. Monitor irritability and depressed mood carefully. If symptoms are worsening, please contact mile immediately.    Your next appointment is scheduled on 7/14/2023 (Fri) at 1pm.        **For crisis resources, please see the information at the end of this document**   Patient Education    Thank you for coming to the [facility] MENTAL HEALTH & ADDICTION [place] [facility].     Lab Testing:  If you had lab testing today and your results are reassuring or normal they will be mailed to you or sent through "[employer] within 7 days. If the lab tests need quick action we will call you with the results. The phone number we will call with results is # [phone]. If this is not the best number please call our clinic and change the number.     Medication Refills:  If you need any refills please call your pharmacy and they will contact us. Our fax number for refills is [phone].   Three business days of notice are needed for general medication refill requests.   Five business days of notice are needed for controlled substance refill requests.   If you need to change to a different pharmacy, please contact the new pharmacy directly. The new pharmacy will help you get your medications transferred.     Contact Us:  Please call [phone] during business hours (8-5:00 M-F).   If you have medication related questions after clinic hours, or on the weekend, please call [phone].     Financial Assistance [phone]   Medical Records [phone]       MENTAL HEALTH CRISIS RESOURCES:  For a emergency help, please call 911 or go to the nearest Emergency Department.     Emergency Walk-In Options:   EmPATH Unit @ [facility] ([place]): [phone] - Specialized mental health emergency area designed to be calming  [facility] ([place]): [phone]  [facility] Acute Psychiatry Services ([place]): [phone]  [facility]):  [phone]    [facility] Crisis Information:   [place]: [phone]  [place]: [phone]  [place] ([place]) - Adult: [phone]     Child: [phone]  [place] - Adult: [phone]     Child: [phone]  [place]: [phone]  List of all [facility] resources:   https://[place].gov/dhs/people-we-serve/adults/health-care/mental-health/resources/crisis-contacts.jsp    National Crisis Information:   Crisis Text Line: Text  [place]  to 373489  Suicide & Crisis Lifeline: 988  National Suicide Prevention Lifeline: 6-111-654-TALK (1-208.361.7817)       For online chat options, visit https://suicidepreventionlifeline.org/chat/  Poison Control Center: 1-736.803.9774  Trans Lifeline: 1-746.668.7691 - Hotline for transgender people of all ages  The Alexis Project: 8-067-021-9515 - Hotline for LGBT youth     For Non-Emergency Support:   Fast Tracker: Mental Health & Substance Use Disorder Resources -   https://www.[employer] [place].org/

## 2023-07-14 ENCOUNTER — VIRTUAL VISIT (OUTPATIENT)
Dept: PSYCHIATRY | Facility: CLINIC | Age: 48
End: 2023-07-14
Attending: NURSE PRACTITIONER
Payer: COMMERCIAL

## 2023-07-14 DIAGNOSIS — F90.2 ATTENTION DEFICIT HYPERACTIVITY DISORDER (ADHD), COMBINED TYPE: Primary | ICD-10-CM

## 2023-07-14 DIAGNOSIS — F31.81 BIPOLAR 2 DISORDER (H): ICD-10-CM

## 2023-07-14 DIAGNOSIS — F41.9 ANXIETY: ICD-10-CM

## 2023-07-14 PROCEDURE — 99214 OFFICE O/P EST MOD 30 MIN: CPT | Mod: VID | Performed by: NURSE PRACTITIONER

## 2023-07-14 RX ORDER — METHYLPHENIDATE HYDROCHLORIDE 18 MG/1
18 TABLET ORAL DAILY
Qty: 30 TABLET | Refills: 0 | Status: SHIPPED | OUTPATIENT
Start: 2023-09-14 | End: 2023-10-14

## 2023-07-14 RX ORDER — METHYLPHENIDATE HYDROCHLORIDE 18 MG/1
18 TABLET ORAL DAILY
Qty: 30 TABLET | Refills: 0 | Status: SHIPPED | OUTPATIENT
Start: 2023-07-14 | End: 2023-08-13

## 2023-07-14 RX ORDER — METHYLPHENIDATE HYDROCHLORIDE 18 MG/1
18 TABLET ORAL DAILY
Qty: 30 TABLET | Refills: 0 | Status: SHIPPED | OUTPATIENT
Start: 2023-08-14 | End: 2023-09-13

## 2023-07-14 ASSESSMENT — PAIN SCALES - GENERAL: PAINLEVEL: NO PAIN (0)

## 2023-07-14 NOTE — PATIENT INSTRUCTIONS
-Decrease Concerta back to 18 mg daily for ADHD. Monitor agitation.  -May continue to use Hydroxyzine for anxiety as needed.    Your next appointment is scheduled on 11/17/2023 (Fri) at 1pm.      **For crisis resources, please see the information at the end of this document**   Patient Education    Thank you for coming to the [facility] MENTAL HEALTH & ADDICTION [place] [facility].     Lab Testing:  If you had lab testing today and your results are reassuring or normal they will be mailed to you or sent through [employer] within 7 days. If the lab tests need quick action we will call you with the results. The phone number we will call with results is # [phone]. If this is not the best number please call our clinic and change the number.     Medication Refills:  If you need any refills please call your pharmacy and they will contact us. Our fax number for refills is [phone].   Three business days of notice are needed for general medication refill requests.   Five business days of notice are needed for controlled substance refill requests.   If you need to change to a different pharmacy, please contact the new pharmacy directly. The new pharmacy will help you get your medications transferred.     Contact Us:  Please call [phone] during business hours (8-5:00 M-F).   If you have medication related questions after clinic hours, or on the weekend, please call [phone].     Financial Assistance [phone]   Medical Records [phone]       MENTAL HEALTH CRISIS RESOURCES:  For a emergency help, please call 911 or go to the nearest Emergency Department.     Emergency Walk-In Options:   EmPATH Unit @ [place] [facility] ([place]): [phone] - Specialized mental health emergency area designed to be calming  [facility] ([place]): [phone]  [facility] Acute Psychiat[facility] Services ([place]): [phone]  [facility] ([place]): [phone]    [facility] Crisis  Information:   [place]: [phone]  [place]: [phone]  [place] ([place]) - Adult: [phone]     Child: [phone]  [place] - Adult: [phone]     Child: [phone]  [place]: [phone]  List of all [facility] resources:   [place]://[place]/dhs/people-we-serve/adults/health-care/mental-health/resources/crisis-contacts.jsp    National Crisis Information:   Crisis Text Line: Text  [place]  to 501740  Suicide & Crisis Lifeline: 988  National Suicide Prevention Lifeline: 4-984-022-TALK (1-401.559.6537)       For online chat options, visit https://suicidepreventionlifeline.org/chat/  Poison Control Center: 1-989.580.3253  Trans Lifeline: 1-729.364.7799 - Hotline for transgender people of all ages  The Alexis Project: 0-765-612-0418 - Hotline for LGBT youth     For Non-Emergency Support:   Fast Tracker: Mental Health & Substance Use Disorder Resources -   https://www.[employer].org/

## 2023-07-14 NOTE — NURSING NOTE
Is the patient currently in the state of [place]? YES    Visit mode:VIDEO    If the visit is dropped, the patient can be reconnected by: VIDEO VISIT: Text to cell phone: [phone]    Will anyone else be joining the visit? NO      How would you like to obtain your AVS? MyChart    Are changes needed to the allergy or medication list? NO     PHQ was not assigned not done per department protocol.    Reason for visit: RECHECK      Alyce Galindo VF
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
## 2023-11-12 ENCOUNTER — MYC MEDICAL ADVICE (OUTPATIENT)
Dept: PSYCHIATRY | Facility: CLINIC | Age: 48
End: 2023-11-12
Payer: COMMERCIAL

## 2023-11-12 DIAGNOSIS — F90.2 ATTENTION DEFICIT HYPERACTIVITY DISORDER (ADHD), COMBINED TYPE: Primary | ICD-10-CM

## 2023-11-13 RX ORDER — METHYLPHENIDATE HYDROCHLORIDE 18 MG/1
18 TABLET ORAL EVERY MORNING
Qty: 30 TABLET | Refills: 0 | Status: SHIPPED | OUTPATIENT
Start: 2023-11-13 | End: 2023-12-22

## 2023-11-13 NOTE — TELEPHONE ENCOUNTER
Last seen: 07/14/23  RTC: 5 weeks   Cancel: 0  No-show: 0  Next appt: 11/17/23     Incoming refill from Patient via [employer]    Medication requested:   Pending Prescriptions:                       Disp   Refills    methylphenidate HCl ER (OSM) (CONCERTA) 1*30 tab*0            Sig: Take 1 tablet (18 mg) by mouth every morning        Last refill per           Medication sent to provider for review.

## 2023-12-17 ASSESSMENT — ANXIETY QUESTIONNAIRES
5. BEING SO RESTLESS THAT IT IS HARD TO SIT STILL: NOT AT ALL
GAD7 TOTAL SCORE: 16
2. NOT BEING ABLE TO STOP OR CONTROL WORRYING: NEARLY EVERY DAY
GAD7 TOTAL SCORE: 16
4. TROUBLE RELAXING: NEARLY EVERY DAY
IF YOU CHECKED OFF ANY PROBLEMS ON THIS QUESTIONNAIRE, HOW DIFFICULT HAVE THESE PROBLEMS MADE IT FOR YOU TO DO YOUR WORK, TAKE CARE OF THINGS AT HOME, OR GET ALONG WITH OTHER PEOPLE: SOMEWHAT DIFFICULT
1. FEELING NERVOUS, ANXIOUS, OR ON EDGE: NEARLY EVERY DAY
7. FEELING AFRAID AS IF SOMETHING AWFUL MIGHT HAPPEN: NEARLY EVERY DAY
6. BECOMING EASILY ANNOYED OR IRRITABLE: SEVERAL DAYS
3. WORRYING TOO MUCH ABOUT DIFFERENT THINGS: NEARLY EVERY DAY

## 2023-12-22 ENCOUNTER — VIRTUAL VISIT (OUTPATIENT)
Dept: PSYCHIATRY | Facility: CLINIC | Age: 48
End: 2023-12-22
Attending: NURSE PRACTITIONER
Payer: COMMERCIAL

## 2023-12-22 DIAGNOSIS — F90.2 ATTENTION DEFICIT HYPERACTIVITY DISORDER (ADHD), COMBINED TYPE: Primary | ICD-10-CM

## 2023-12-22 DIAGNOSIS — F41.9 ANXIETY: ICD-10-CM

## 2023-12-22 DIAGNOSIS — F31.81 BIPOLAR 2 DISORDER (H): ICD-10-CM

## 2023-12-22 PROCEDURE — 99214 OFFICE O/P EST MOD 30 MIN: CPT | Mod: 95 | Performed by: NURSE PRACTITIONER

## 2023-12-22 RX ORDER — METHYLPHENIDATE HYDROCHLORIDE 18 MG/1
18 TABLET ORAL DAILY
Qty: 30 TABLET | Refills: 0 | Status: SHIPPED | OUTPATIENT
Start: 2024-02-22 | End: 2024-03-23

## 2023-12-22 RX ORDER — METHYLPHENIDATE HYDROCHLORIDE 18 MG/1
18 TABLET ORAL DAILY
Qty: 30 TABLET | Refills: 0 | Status: SHIPPED | OUTPATIENT
Start: 2023-12-22 | End: 2024-01-21

## 2023-12-22 RX ORDER — METHYLPHENIDATE HYDROCHLORIDE 18 MG/1
18 TABLET ORAL DAILY
Qty: 30 TABLET | Refills: 0 | Status: SHIPPED | OUTPATIENT
Start: 2024-01-22 | End: 2024-02-18

## 2023-12-22 ASSESSMENT — PAIN SCALES - GENERAL: PAINLEVEL: NO PAIN (0)
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
## 2023-12-22 NOTE — NURSING NOTE
Is the patient currently in the state of [place]? YES    Visit mode:VIDEO    If the visit is dropped, the patient can be reconnected by: VIDEO VISIT: Send to e-mail at: [email or web address]    Will anyone else be joining the visit? NO  (If patient encounters technical issues they should call [phone]    How would you like to obtain your AVS? MyChart    Are changes needed to the allergy or medication list? No    Reason for visit: RECHANAND SANCHEZ

## 2023-12-22 NOTE — PATIENT INSTRUCTIONS
-Continue on current medication regimen.    Your next appointment is scheduled on 6/14/2024 (Fri) at 10am.      **For crisis resources, please see the information at the end of this document**   Patient Education    Thank you for coming to the [facility] [place] [facility].     Lab Testing:  If you had lab testing today and your results are reassuring or normal they will be mailed to you or sent through [employer] within 7 days. If the lab tests need quick action we will call you with the results. The phone number we will call with results is # [phone]. If this is not the best number please call our clinic and change the number.     Medication Refills:  If you need any refills please call your pharmacy and they will contact us. Our fax number for refills is [phone].   Three business days of notice are needed for general medication refill requests.   Five business days of notice are needed for controlled substance refill requests.   If you need to change to a different pharmacy, please contact the new pharmacy directly. The new pharmacy will help you get your medications transferred.     Contact Us:  Please call [phone] during business hours (8-5:00 M-F).   If you have medication related questions after clinic hours, or on the weekend, please call [phone].     Financial Assistance [phone]   Medical Records [phone]       MENTAL HEALTH CRISIS RESOURCES:  For a emergency help, please call 911 or go to the nearest Emergency Department.     Emergency Walk-In Options:   [facility] Unit @ [facility] ([place]): [phone] - Specialized mental health emergency area designed to be calming  [facility] ([place]): [phone]  [facility] ([place]): [phone]  [facility]): [phone]    [facility] Crisis Information:   [place]: [phone]  [place]: [phone]  [place] ([place]) - Adult:  [phone]     Child: [phone]  [place] - Adult: [phone]     Child: [phone]  [place]: [phone]  List of all [facility] resources:   https://[place].gov/dhs/people-we-serve/adults/health-care/mental-health/resources/crisis-contacts.jsp    National Crisis Information:   Crisis Text Line: Text  [place]  to 421445  Suicide & Crisis Lifeline: 988  National Suicide Prevention Lifeline: 5-425-953-TALK (1-217.607.8706)       For online chat options, visit https://suicidepreventionlifeline.org/chat/  Poison Control Center: 1-382.750.4769  Trans Lifeline: 1-704.835.9415 - Hotline for transgender people of all ages  The [employer]: 4-783-095-8559 - Hotline for LGBT youth     For Non-Emergency Support:   Fast Tracker: Mental Health & Substance Use Disorder Resources -   https://www.[employer].org/

## 2024-02-18 ENCOUNTER — MYC REFILL (OUTPATIENT)
Dept: PSYCHIATRY | Facility: CLINIC | Age: 49
End: 2024-02-18
Payer: COMMERCIAL

## 2024-02-18 DIAGNOSIS — F90.2 ATTENTION DEFICIT HYPERACTIVITY DISORDER (ADHD), COMBINED TYPE: ICD-10-CM

## 2024-02-19 RX ORDER — METHYLPHENIDATE HYDROCHLORIDE 18 MG/1
18 TABLET ORAL DAILY
Qty: 30 TABLET | Refills: 0 | Status: SHIPPED | OUTPATIENT
Start: 2024-02-19 | End: 2024-06-14

## 2024-02-19 NOTE — TELEPHONE ENCOUNTER
Last seen: 12/22/2023  RTC: 6 months  Cancel: 0  No-show: 0  Next appt: 06/14/2024     Incoming refill from Patient via [facility]    Medication requested:   Pending Prescriptions:                       Disp   Refills    methylphenidate HCl ER (OSM) (CONCERTA) 1*30 tab*0            Sig: Take 1 tablet (18 mg) by mouth daily        Last refill per       From chart note:    Continue on current medication regimen.     Medication unable to be refilled by RN due to criteria not met as indicated.                 []Eligibility - not seen in the last year              []Supervision - no future appointment              []Compliance - no shows, cancellations or lapse in therapy              []Verification - order discrepancy              [x]Controlled medication              []Medication not included in policy              []90-day supply request              []Other:

## 2024-06-14 ENCOUNTER — VIRTUAL VISIT (OUTPATIENT)
Dept: PSYCHIATRY | Facility: CLINIC | Age: 49
End: 2024-06-14
Attending: NURSE PRACTITIONER
Payer: COMMERCIAL

## 2024-06-14 VITALS — HEIGHT: 63 IN | BODY MASS INDEX: 23.04 KG/M2 | WEIGHT: 130 LBS

## 2024-06-14 DIAGNOSIS — F41.9 ANXIETY: ICD-10-CM

## 2024-06-14 DIAGNOSIS — F31.81 BIPOLAR 2 DISORDER (H): ICD-10-CM

## 2024-06-14 DIAGNOSIS — F90.2 ATTENTION DEFICIT HYPERACTIVITY DISORDER (ADHD), COMBINED TYPE: Primary | ICD-10-CM

## 2024-06-14 PROCEDURE — G2211 COMPLEX E/M VISIT ADD ON: HCPCS | Mod: 95 | Performed by: NURSE PRACTITIONER

## 2024-06-14 PROCEDURE — 99214 OFFICE O/P EST MOD 30 MIN: CPT | Mod: 95 | Performed by: NURSE PRACTITIONER

## 2024-06-14 RX ORDER — METHYLPHENIDATE HYDROCHLORIDE 18 MG/1
18 TABLET ORAL DAILY
Qty: 30 TABLET | Refills: 0 | Status: SHIPPED | OUTPATIENT
Start: 2024-08-13 | End: 2024-09-12

## 2024-06-14 RX ORDER — METHYLPHENIDATE HYDROCHLORIDE 18 MG/1
18 TABLET ORAL DAILY
Qty: 30 TABLET | Refills: 0 | Status: CANCELLED | OUTPATIENT
Start: 2024-06-14

## 2024-06-14 RX ORDER — METHYLPHENIDATE HYDROCHLORIDE 18 MG/1
18 TABLET ORAL DAILY
Qty: 30 TABLET | Refills: 0 | Status: SHIPPED | OUTPATIENT
Start: 2024-06-14 | End: 2024-07-14

## 2024-06-14 RX ORDER — METHYLPHENIDATE HYDROCHLORIDE 18 MG/1
18 TABLET ORAL DAILY
Qty: 30 TABLET | Refills: 0 | Status: SHIPPED | OUTPATIENT
Start: 2024-07-14 | End: 2024-08-13

## 2024-06-14 ASSESSMENT — PAIN SCALES - GENERAL: PAINLEVEL: NO PAIN (0)

## 2024-06-14 NOTE — NURSING NOTE
Is the patient currently in the state of [place]? YES    Visit mode:VIDEO    If the visit is dropped, the patient can be reconnected by: VIDEO VISIT: Send to e-mail at: [email or web address]    Will anyone else be joining the visit? NO  (If patient encounters technical issues they should call [phone]    How would you like to obtain your AVS? MyChart    Are changes needed to the allergy or medication list? Yes Pt needs to update her allergy list. Pt states she has discovered the rashes she's been getting are not medication allergies but related to something else.     Are refills needed on medications prescribed by this physician? YES    Reason for visit: RECHECK    Anayeli SANCHEZ
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
## 2024-06-14 NOTE — PATIENT INSTRUCTIONS
**For crisis resources, please see the information at the end of this document**   Patient Education    Thank you for coming to the [facility] [place] [facility].     Lab Testing:  If you had lab testing today and your results are reassuring or normal they will be mailed to you or sent through [employer] within 7 days. If the lab tests need quick action we will call you with the results. The phone number we will call with results is # [phone]. If this is not the best number please call our clinic and change the number.     Medication Refills:  If you need any refills please call your pharmacy and they will contact us. Our fax number for refills is [phone].   Three business days of notice are needed for general medication refill requests.   Five business days of notice are needed for controlled substance refill requests.   If you need to change to a different pharmacy, please contact the new pharmacy directly. The new pharmacy will help you get your medications transferred.     Contact Us:  Please call [phone] during business hours (8-5:00 M-F).   If you have medication related questions after clinic hours, or on the weekend, please call [phone].     Financial Assistance [phone]   Medical Records [phone]       MENTAL HEALTH CRISIS RESOURCES:  For a emergency help, please call 911 or go to the nearest Emergency Department.     Emergency Walk-In Options:   EmPATH Unit @ [facility] ([place]): [phone] - Specialized mental health emergency area designed to be calming  [facility] ([place]): [phone]  [facility] [place] [place] [facility] ([place]): [phone]  [facility]): [phone]    [facility] Crisis Information:   [place]: [phone]  [place]: [phone]  [place] ([place]) - Adult: [phone]     Child: [phone]  [place] - Adult: [phone]     Child: [phone]  [place]:  [phone]  List of all [facility] resources:   https://[place].gov/dhs/people-we-serve/adults/health-care/mental-health/resources/crisis-contacts.jsp    National Crisis Information:   Crisis Text Line: Text  [place]  to 777902  Suicide & Crisis Lifeline: 988  National Suicide Prevention Lifeline: 6-669-156-TALK (1-789.886.4199)       For online chat options, visit https://suicidepreventionlifeline.org/chat/  Poison Control Center: 6-217-312-9110  Trans Lifeline: 4-434-493-4575 - Hotline for transgender people of all ages  The [employer] Project: 4-944-490-6929 - Hotline for LGBT youth     For Non-Emergency Support:   Fast Tracker: Mental Health & Substance Use Disorder Resources -   https://www.[facility].org/

## 2024-06-23 ENCOUNTER — HEALTH MAINTENANCE LETTER (OUTPATIENT)
Age: 49
End: 2024-06-23

## 2025-03-29 ENCOUNTER — HEALTH MAINTENANCE LETTER (OUTPATIENT)
Age: 50
End: 2025-03-29

## 2025-07-12 ENCOUNTER — HEALTH MAINTENANCE LETTER (OUTPATIENT)
Age: 50
End: 2025-07-12

## 2025-07-27 ENCOUNTER — MYC REFILL (OUTPATIENT)
Dept: PSYCHIATRY | Facility: CLINIC | Age: 50
End: 2025-07-27
Payer: COMMERCIAL

## 2025-07-27 DIAGNOSIS — F90.2 ATTENTION DEFICIT HYPERACTIVITY DISORDER (ADHD), COMBINED TYPE: ICD-10-CM

## 2025-07-28 RX ORDER — METHYLPHENIDATE HYDROCHLORIDE 18 MG/1
18 TABLET ORAL DAILY
Qty: 30 TABLET | Refills: 0 | Status: SHIPPED | OUTPATIENT
Start: 2025-07-28